# Patient Record
Sex: FEMALE | Race: BLACK OR AFRICAN AMERICAN | Employment: FULL TIME | URBAN - METROPOLITAN AREA
[De-identification: names, ages, dates, MRNs, and addresses within clinical notes are randomized per-mention and may not be internally consistent; named-entity substitution may affect disease eponyms.]

---

## 2017-12-06 ENCOUNTER — OFFICE VISIT (OUTPATIENT)
Dept: FAMILY MEDICINE | Facility: CLINIC | Age: 57
End: 2017-12-06
Payer: COMMERCIAL

## 2017-12-06 VITALS
DIASTOLIC BLOOD PRESSURE: 84 MMHG | SYSTOLIC BLOOD PRESSURE: 123 MMHG | BODY MASS INDEX: 30.49 KG/M2 | HEART RATE: 81 BPM | TEMPERATURE: 98 F | WEIGHT: 183 LBS | OXYGEN SATURATION: 99 % | HEIGHT: 65 IN

## 2017-12-06 DIAGNOSIS — E03.8 SUBCLINICAL HYPOTHYROIDISM: ICD-10-CM

## 2017-12-06 DIAGNOSIS — R42 DIZZY SPELLS: ICD-10-CM

## 2017-12-06 DIAGNOSIS — N95.1 PERIMENOPAUSAL: ICD-10-CM

## 2017-12-06 DIAGNOSIS — N92.6 IRREGULAR MENSES: Primary | ICD-10-CM

## 2017-12-06 DIAGNOSIS — Z86.2 HISTORY OF ANEMIA: ICD-10-CM

## 2017-12-06 LAB
ERYTHROCYTE [DISTWIDTH] IN BLOOD BY AUTOMATED COUNT: 12.9 % (ref 10–15)
HCT VFR BLD AUTO: 39.2 % (ref 35–47)
HGB BLD-MCNC: 13 G/DL (ref 11.7–15.7)
MCH RBC QN AUTO: 32.3 PG (ref 26.5–33)
MCHC RBC AUTO-ENTMCNC: 33.2 G/DL (ref 31.5–36.5)
MCV RBC AUTO: 97 FL (ref 78–100)
PLATELET # BLD AUTO: 313 10E9/L (ref 150–450)
RBC # BLD AUTO: 4.03 10E12/L (ref 3.8–5.2)
T4 FREE SERPL-MCNC: 0.92 NG/DL (ref 0.76–1.46)
TSH SERPL DL<=0.005 MIU/L-ACNC: 8.89 MU/L (ref 0.4–4)
WBC # BLD AUTO: 4.7 10E9/L (ref 4–11)

## 2017-12-06 PROCEDURE — 85027 COMPLETE CBC AUTOMATED: CPT | Performed by: FAMILY MEDICINE

## 2017-12-06 PROCEDURE — 84443 ASSAY THYROID STIM HORMONE: CPT | Performed by: FAMILY MEDICINE

## 2017-12-06 PROCEDURE — 84439 ASSAY OF FREE THYROXINE: CPT | Performed by: FAMILY MEDICINE

## 2017-12-06 PROCEDURE — 36415 COLL VENOUS BLD VENIPUNCTURE: CPT | Performed by: FAMILY MEDICINE

## 2017-12-06 PROCEDURE — 99214 OFFICE O/P EST MOD 30 MIN: CPT | Performed by: FAMILY MEDICINE

## 2017-12-06 NOTE — MR AVS SNAPSHOT
"              After Visit Summary   12/6/2017    Gilberto Bauman    MRN: [id number]           Patient Information     Date Of Birth          1960        Visit Information        Provider Department      12/6/2017 10:00 AM [facility]        Today's Diagnoses     Irregular menses    -  1    History of anemia        Perimenopausal        Subclinical hypothyroidism        Dizziness          Care Instructions    Schedule a Pelvic US          Follow-ups after your visit        Follow-up notes from your care team     Return if symptoms worsen or fail to improve.      Future tests that were ordered for you today     Open Future Orders        Priority Expected Expires Ordered    US Pelvic Complete w Transvaginal Routine  12/6/2018 12/6/2017            Who to contact     Normal or non-critical lab and imaging results will be communicated to you by [employer], letter or phone within 4 business days after the clinic has received the results. If you do not hear from us within 7 days, please contact the clinic through [employer] or phone. If you have a critical or abnormal lab result, we will notify you by phone as soon as possible.  Submit refill requests through [employer] or call your pharmacy and they will forward the refill request to us. Please allow 3 business days for your refill to be completed.          If you need to speak with a  for additional information , please call: [phone]             Additional Information About Your Visit        [employer] Information     [employer] lets you send messages to your doctor, view your test results, renew your prescriptions, schedule appointments and more. To sign up, go to [email or web address] . Click on \"Log in\" on the left side of the screen, which will take you to the Welcome page. Then click on \"Sign up Now\" on the right side of the page.     You will be asked to enter the access code listed below, as well as some personal " "information. Please follow the directions to create your username and password.     Your access code is: [email or web address]  Expires: 3/6/2018 11:21 AM     Your access code will  in 90 days. If you need help or a new code, please call your [place] clinic or [phone].        Care EveryWhere ID     This is your Care EveryWhere ID. This could be used by other organizations to access your [place] medical records  [id number]        Your Vitals Were     Pulse Temperature Height Pulse Oximetry Breastfeeding? BMI (Body Mass Index)    81 98  F (36.7  C) (Tympanic) 5' 5\" (1.651 m) 99% No 30.45 kg/m2       Blood Pressure from Last 3 Encounters:   17 123/84   01/15/16 116/74   16 112/72    Weight from Last 3 Encounters:   17 183 lb (83 kg)   01/15/16 194 lb 6.4 oz (88.2 kg)   16 194 lb (88 kg)              We Performed the Following     CBC with platelets     Folate     TSH with free T4 reflex     Vitamin B12     Vitamin D Deficiency        Primary Care Provider Office Phone # Fax #    [facility] [phone]       [address] [facility] [address]        Equal Access to Services     ABILIO CARMICHAEL : Hadii aad ku hadasho Soomaali, waaxda luqadaha, qaybta kaalmada adeegyada, waxay idiin hayaan nav khteofilo paula . So [facility] [phone].    ATENCIÓN: Si habla español, tiene a arechiga disposición servicios gratuitos de asistencia lingüística. Llame al [phone].    We comply with applicable federal civil rights laws and [place] laws. We do not discriminate on the basis of race, color, national origin, age, disability, sex, sexual orientation, or gender identity.            Thank you!     Thank you for choosing [facility]  for your care. Our goal is always to provide you with excellent care. Hearing back from our patients is one way we can continue to improve our services. Please take a few minutes to complete the written survey that you may receive in the " mail after your visit with us. Thank you!             Your Updated Medication List - Protect others around you: Learn how to safely use, store and throw away your medicines at www.disposemymeds.org.          This list is accurate as of: 12/6/17 11:21 AM.  Always use your most recent med list.                   Brand Name Dispense Instructions for use Diagnosis    NO ACTIVE MEDICATIONS

## 2017-12-06 NOTE — PATIENT INSTRUCTIONS
Schedule a Pelvic US  Perimenopause  Menopause is when you stop having periods for good. For many women, this happens around age 50. The time of change before this is called perimenopause. It may start in your late 30s or 40s. It can last for months or years. During this time, your body makes lower levels of female hormones. This causes certain changes in your body. You may already have begun to feel the effects of these changes. By taking steps to relieve symptoms, you can still feel good.    The menstrual cycle  Hormones are chemicals that have specific jobs in the body. A menstrual cycle is caused by changes in the levels of 2 female hormones. These hormones are estrogen and progesterone. They are made by the ovaries. In a normal cycle, estrogen creates a lining in the uterus to allow for pregnancy. The ovary then releases an egg. This is called ovulation. Progesterone levels start to go up. If the egg is not fertilized, progesterone levels go down. This causes the lining in the uterus to be shed. This is the bleeding that is your period.  Changes in hormones  As a woman ages, her ovaries begin to make hormones less regularly. In some months, there may not be ovulation. Without ovulation, progesterone isn't secreted so a normal period doesn't occur. The menstrual cycle will be harder to predict. Over time, the ovaries stop working. This can cause symptoms. Some women who have had their uterus taken out (hysterectomy) but still have ovaries may still have symptoms. When estrogen levels reach their lowest point, periods will stop completely. This is menopause.  Symptoms of perimenopause  The change in hormones can cause physical symptoms. It can also cause emotional symptoms. These may include:    Periods that come more or less often    Periods that are lighter or heavier than you re used to    Hot flashes, night sweats, or trouble sleeping    Vaginal dryness, which may make sex painful    Mood swings or  fatigue    Palpitations    Sleep disturbances    Urinary symptoms including frequency and incontinence.  Medications that may help  Some medications can help ease the effects of perimenopause. These include:    Low-dose birth control pills. These often contain both estrogen and progesterone. They can help regulate your periods.    Hormone therapy (HT). This replaces some of the hormones your body has stopped making.    Antidepressants. These help balance brain chemicals that may decrease during this time. Signs of depression can include often feeling sad or hopeless. If you feel this way, be sure to talk to your health care provider.    Gabapentin. This is a medication also used to treat seizures. This controls hot flashes and night sweats in some women.    Clonidine. This medication may control hot flashes and night sweats.  Date Last Reviewed: 4/26/2015 2000-2017 [employer]. [address] [place] [address], [place] [address]. All rights reserved. This information is not intended as a substitute for professional medical care. Always follow your healthcare professional's instructions.

## 2017-12-06 NOTE — PROGRESS NOTES
SUBJECTIVE:   Gilberto Bauman is a 57 year old female who presents to clinic today for the following health issues:      Vaginal Bleeding (Dysmenorrhea)  Onset: x5 days    Description:   Duration of bleeding episodes: NA  Frequency between periods:  Last period was under a year ago.   Describe bleeding/flow:   Clots: no  Number of pads/hour: 0-1; bleeding has not been heavy but is not becoming lighter with time either.   Cramping: none.  But reporting breast pains and back aches before bleeding started.     Accompanying Signs & Symptoms:  Weakness: YES- over the past x3 days   Lightheadedness: no  Hot flashes: no  Nosebleeds/Easy bruising: no  Vaginal Discharge: no    History:  No LMP recorded. Patient is not currently having periods (Reason: Irregular Periods).  Previous normal periods: no- use to get her periods Q3 months  Contraceptive use: NO  Possibility of Pregnancy: does not think so.   Any bleeding after intercourse: no  Age of first period (menarche): 13-15 YO  Abnormal PAP Smears: no    Precipitating factors:   none    Alleviating factors:  None      Therapies Tried and outcome: none      Patient has had similar concerns in the past. Saw her previous provider, Dr Thorpe in 2016. Labs done were remarkable for elevated TSH but normal free T4.   Pelvic US was requested but patient never followed through.     She has expressed concern in the past about her actual age. States that she is a refuge from [place] and thinks that she is younger than the current listed age but is not absolutely sure about her .    Problem list and histories reviewed & adjusted, as indicated.  Additional history: as documented    Patient Active Problem List   Diagnosis     CARDIOVASCULAR SCREENING; LDL GOAL LESS THAN 160     Vitamin D deficiency     Hypothyroidism, unspecified hypothyroidism type     Anemia, unspecified anemia type     Past Surgical History:   Procedure Laterality Date     NO HISTORY OF SURGERY         Social  "History   Substance Use Topics     Smoking status: Never Smoker     Smokeless tobacco: Never Used     Alcohol use No     Family History   Problem Relation Age of Onset     DIABETES No family hx of      Coronary Artery Disease No family hx of      Hypertension No family hx of      Hyperlipidemia No family hx of      CEREBROVASCULAR DISEASE No family hx of      Breast Cancer No family hx of      Colon Cancer No family hx of      Thyroid Disease No family hx of          Current Outpatient Prescriptions   Medication Sig Dispense Refill     NO ACTIVE MEDICATIONS        Allergies   Allergen Reactions     Amoxicillin      Rash         Reviewed and updated as needed this visit by clinical staffTobacco  Allergies  Meds       Reviewed and updated as needed this visit by Provider         ROS:  Constitutional, HEENT, cardiovascular, pulmonary, gi and gu systems are negative, except as otherwise noted.      OBJECTIVE:   /84  Pulse 81  Temp 98  F (36.7  C) (Tympanic)  Ht 5' 5\" (1.651 m)  Wt 183 lb (83 kg)  SpO2 99%  Breastfeeding? No  BMI 30.45 kg/m2  Body mass index is 30.45 kg/(m^2).  GENERAL: healthy, alert and no distress  RESP: lungs clear to auscultation - no rales, rhonchi or wheezes  CV: regular rate and rhythm, normal S1 S2, no S3 or S4, no murmur, click or rub, no peripheral edema and peripheral pulses strong  ABDOMEN: soft, nontender, no hepatosplenomegaly, no masses and bowel sounds normal    Diagnostic Test Results:  Reviewed and discussed with patient prior to discharge.  Results for orders placed or performed in visit on 12/06/17   CBC with platelets   Result Value Ref Range    WBC 4.7 4.0 - 11.0 10e9/L    RBC Count 4.03 3.8 - 5.2 10e12/L    Hemoglobin 13.0 11.7 - 15.7 g/dL    Hematocrit 39.2 35.0 - 47.0 %    MCV 97 78 - 100 fl    MCH 32.3 26.5 - 33.0 pg    MCHC 33.2 31.5 - 36.5 g/dL    RDW 12.9 10.0 - 15.0 %    Platelet Count 313 150 - 450 10e9/L         ASSESSMENT/PLAN:     Gilberto was seen today for " vaginal bleeding.    Diagnoses and all orders for this visit:    Irregular menses, likely due to Perimenopause; hypothyroidism  -     CBC with platelets  -     TSH with free T4 reflex  -     US Pelvic Complete w Transvaginal; Future    Perimenopausal  Patient education and Handout given      Subclinical hypothyroidism  -     TSH with free T4 reflex    History of anemia  -     CBC with platelets    Dizzy spells  -     CBC with platelets  -     TSH with free T4 reflex  -     Vitamin D Deficiency; Future  -     Vitamin B12; Future  -     Folate; Future    Will notify her with the rest of the labs,  US results and next steps.        Follow up if symptoms fail to improve or worsen.      The patient was in agreement with the plan today and had no questions or concerns prior to leaving the clinic.        Gracy Rocha MD  [facility]

## 2017-12-09 ENCOUNTER — RADIANT APPOINTMENT (OUTPATIENT)
Dept: ULTRASOUND IMAGING | Facility: CLINIC | Age: 57
End: 2017-12-09
Attending: FAMILY MEDICINE
Payer: COMMERCIAL

## 2017-12-09 DIAGNOSIS — N92.6 IRREGULAR MENSES: ICD-10-CM

## 2017-12-09 PROCEDURE — 76856 US EXAM PELVIC COMPLETE: CPT

## 2017-12-09 PROCEDURE — 76830 TRANSVAGINAL US NON-OB: CPT

## 2017-12-11 ENCOUNTER — RADIANT APPOINTMENT (OUTPATIENT)
Dept: GENERAL RADIOLOGY | Facility: CLINIC | Age: 57
End: 2017-12-11
Attending: PHYSICIAN ASSISTANT
Payer: COMMERCIAL

## 2017-12-11 ENCOUNTER — OFFICE VISIT (OUTPATIENT)
Dept: FAMILY MEDICINE | Facility: CLINIC | Age: 57
End: 2017-12-11
Payer: COMMERCIAL

## 2017-12-11 ENCOUNTER — TELEPHONE (OUTPATIENT)
Dept: FAMILY MEDICINE | Facility: CLINIC | Age: 57
End: 2017-12-11

## 2017-12-11 VITALS
SYSTOLIC BLOOD PRESSURE: 116 MMHG | WEIGHT: 182 LBS | BODY MASS INDEX: 30.32 KG/M2 | RESPIRATION RATE: 18 BRPM | HEIGHT: 65 IN | DIASTOLIC BLOOD PRESSURE: 82 MMHG | HEART RATE: 114 BPM | TEMPERATURE: 98.2 F | OXYGEN SATURATION: 98 %

## 2017-12-11 DIAGNOSIS — Z11.59 ENCOUNTER FOR HEPATITIS C SCREENING TEST FOR LOW RISK PATIENT: ICD-10-CM

## 2017-12-11 DIAGNOSIS — E06.3 HYPOTHYROIDISM DUE TO HASHIMOTO'S THYROIDITIS: ICD-10-CM

## 2017-12-11 DIAGNOSIS — Z12.11 SPECIAL SCREENING FOR MALIGNANT NEOPLASMS, COLON: ICD-10-CM

## 2017-12-11 DIAGNOSIS — F51.04 PSYCHOPHYSIOLOGICAL INSOMNIA: ICD-10-CM

## 2017-12-11 DIAGNOSIS — R93.89 THICKENED ENDOMETRIUM: ICD-10-CM

## 2017-12-11 DIAGNOSIS — Z12.31 ENCOUNTER FOR SCREENING MAMMOGRAM FOR BREAST CANCER: ICD-10-CM

## 2017-12-11 DIAGNOSIS — R79.89 ELEVATED TSH: Primary | ICD-10-CM

## 2017-12-11 DIAGNOSIS — R09.89 RESPIRATORY CRACKLES AT RIGHT LUNG BASE: ICD-10-CM

## 2017-12-11 LAB
T3 SERPL-MCNC: 96 NG/DL (ref 60–181)
T4 FREE SERPL-MCNC: 0.87 NG/DL (ref 0.76–1.46)
TSH SERPL DL<=0.005 MIU/L-ACNC: 10.27 MU/L (ref 0.4–4)

## 2017-12-11 PROCEDURE — 86376 MICROSOMAL ANTIBODY EACH: CPT | Performed by: PHYSICIAN ASSISTANT

## 2017-12-11 PROCEDURE — 36415 COLL VENOUS BLD VENIPUNCTURE: CPT | Performed by: PHYSICIAN ASSISTANT

## 2017-12-11 PROCEDURE — 86803 HEPATITIS C AB TEST: CPT | Performed by: PHYSICIAN ASSISTANT

## 2017-12-11 PROCEDURE — 99214 OFFICE O/P EST MOD 30 MIN: CPT | Performed by: PHYSICIAN ASSISTANT

## 2017-12-11 PROCEDURE — 84480 ASSAY TRIIODOTHYRONINE (T3): CPT | Performed by: PHYSICIAN ASSISTANT

## 2017-12-11 PROCEDURE — 84439 ASSAY OF FREE THYROXINE: CPT | Performed by: PHYSICIAN ASSISTANT

## 2017-12-11 PROCEDURE — 71020 XR CHEST 2 VW: CPT

## 2017-12-11 PROCEDURE — 84443 ASSAY THYROID STIM HORMONE: CPT | Performed by: PHYSICIAN ASSISTANT

## 2017-12-11 RX ORDER — MIRTAZAPINE 30 MG/1
30 TABLET, FILM COATED ORAL AT BEDTIME
Qty: 30 TABLET | Refills: 1 | Status: SHIPPED | OUTPATIENT
Start: 2017-12-11 | End: 2018-01-02

## 2017-12-11 NOTE — TELEPHONE ENCOUNTER
Spoke with patient and she gave permission for me to speak with sister Lidia.  Per sister patient is not doing any better.  Patient not eating or sleeping.  Sister wants patient to be seen today to be reevaluated and to go over results from 12-6-17.  Dr Rocha is out of office.  Patient scheduled with Scott Echeverria PA-C to be re-evaluated.

## 2017-12-11 NOTE — PROGRESS NOTES
SUBJECTIVE:   Gilberto Bauman is a 57 year old female who presents to clinic today for the following health issues:      Thyroid Problem-discuss results today  No snoring  Noting insomnia.  Variable appetite.   Some abdominal pain. Some mild nausea. No diarrhea.   No chest pain/sob/palps  No fevers.     Problem list and histories reviewed & adjusted, as indicated.  Additional history: as documented        Reviewed and updated as needed this visit by clinical staff  Tobacco  Allergies  Meds       Reviewed and updated as needed this visit by Provider         All other systems negative except as outline above    OBJECTIVE:  Eye exam - right eye normal lid, conjunctiva, cornea, pupil and fundus, left eye normal lid, conjunctiva, cornea, pupil and fundus.  ENT exam reveals - ENT exam normal, no neck nodes or sinus tenderness.  CHEST:chest clear to IPPA, no tachypnea, retractions or cyanosis and S1, S2 normal, no murmur, no gallop, rate regular. Originally course crackles appreciated in her RLL, these resolved have several deep breaths. Thyroid not palpable, not enlarged, no nodules detected.  No edema  The abdomen is soft without tenderness, guarding, mass, rebound or organomegaly. Bowel sounds are normal. No CVA tenderness or inguinal adenopathy noted.      Gilberto was seen today for thyroid problem.    Diagnoses and all orders for this visit:    Encounter for screening mammogram for breast cancer  -     *MA Screening Digital Bilateral; Future    Encounter for hepatitis C screening test for low risk patient  -     Hepatitis C Screen Reflex to HCV RNA Quant and Genotype    Special screening for malignant neoplasms, colon  -     Fecal colorectal cancer screen (FIT); Future    Thickened endometrium  -     OB/GYN REFERRAL    Elevated TSH  -     TSH with free T4 reflex  -     T3, total  -     Thyroid peroxidase antibody    Psychophysiological insomnia  -     mirtazapine (REMERON) 30 MG tablet; Take 1 tablet (30 mg) by mouth At  Bedtime    Respiratory crackles at right lung base  -     XR Chest 2 Views      work on lifestyle modification  Advised supportive and symptomatic treatment.  Follow up with Provider - if condition persists or worsens.

## 2017-12-11 NOTE — MR AVS SNAPSHOT
After Visit Summary   12/11/2017    Gilberto Bauman    MRN: [id number]           Patient Information     Date Of Birth          1960        Visit Information        Provider Department      12/11/2017 11:20 AM Scott Echeverria PA-C [facility]        Today's Diagnoses     Encounter for screening mammogram for breast cancer    -  1    Encounter for hepatitis C screening test for low risk patient        Special screening for malignant neoplasms, colon        Thickened endometrium        Elevated TSH        Psychophysiological insomnia        Respiratory crackles at right lung base           Follow-ups after your visit        Additional Services     OB/GYN REFERRAL       Your provider has referred you to:  [facility]: [facility] [phone]   [email or web address]    Please be aware that coverage of these services is subject to the terms and limitations of your health insurance plan.  Call member services at your health plan with any benefit or coverage questions.      Please bring the following with you to your appointment:    (1) Any X-Rays, CTs or MRIs which have been performed.  Contact the facility where they were done to arrange for  prior to your scheduled appointment.   (2) List of current medications   (3) This referral request   (4) Any documents/labs given to you for this referral                  Future tests that were ordered for you today     Open Future Orders        Priority Expected Expires Ordered    *MA Screening Digital Bilateral Routine  12/11/2018 12/11/2017    Fecal colorectal cancer screen (FIT) Routine 1/1/2018 3/5/2018 12/11/2017            Who to contact     Normal or non-critical lab and imaging results will be communicated to you by MyChart, letter or phone within 4 business days after the clinic has received the results. If you do not hear from us within 7 days, please contact the clinic through MyChart or phone. If  "you have a critical or abnormal lab result, we will notify you by phone as soon as possible.  Submit refill requests through [employer] or call your pharmacy and they will forward the refill request to us. Please allow 3 business days for your refill to be completed.          If you need to speak with a  for additional information , please call: [phone]             Additional Information About Your Visit        [employer] Information     [employer] lets you send messages to your doctor, view your test results, renew your prescriptions, schedule appointments and more. To sign up, go to [email or web address] . Click on \"Log in\" on the left side of the screen, which will take you to the Welcome page. Then click on \"Sign up Now\" on the right side of the page.     You will be asked to enter the access code listed below, as well as some personal information. Please follow the directions to create your username and password.     Your access code is: [email or web address]  Expires: 3/6/2018 11:21 AM     Your access code will  in 90 days. If you need help or a new code, please call your [place] clinic or [phone].        Care EveryWhere ID     This is your Care EveryWhere ID. This could be used by other organizations to access your [place] medical records  [id number]        Your Vitals Were     Pulse Temperature Respirations Height Pulse Oximetry BMI (Body Mass Index)    114 98.2  F (36.8  C) (Tympanic) 18 5' 5\" (1.651 m) 98% 30.29 kg/m2       Blood Pressure from Last 3 Encounters:   17 116/82   17 123/84   01/15/16 116/74    Weight from Last 3 Encounters:   17 182 lb (82.6 kg)   17 183 lb (83 kg)   01/15/16 194 lb 6.4 oz (88.2 kg)              We Performed the Following     Hepatitis C Screen Reflex to HCV RNA Quant and Genotype     OB/GYN REFERRAL     T3, total     Thyroid peroxidase antibody     TSH with free T4 reflex     XR Chest 2 Views          Today's Medication Changes    "       These changes are accurate as of: 12/11/17 12:17 PM.  If you have any questions, ask your nurse or doctor.               Start taking these medicines.        Dose/Directions    mirtazapine 30 MG tablet   Commonly known as:  REMERON   Used for:  Psychophysiological insomnia   Started by:  Scott Echeverria PA-C        Dose:  30 mg   Take 1 tablet (30 mg) by mouth At Bedtime   Quantity:  30 tablet   Refills:  1            Where to get your medicines      [facility] [address] AT [facility] [address] [place]  [address] [facility] [place] [address]     Phone:  [phone]     mirtazapine 30 MG tablet                Primary Care Provider Office Phone # Fax #    [facility] [phone] [address] [facility] [address]        Equal Access to Services     [facility] CenterBRITTANY : Hadii aad ku hadasho Soomaali, waaxda luqadaha, qaybta kaalmada adeegyada, waxay idiin hayaan nav paula . So [facility] [phone].    ATENCIÓN: Si habla español, tiene a arechiga disposición servicios gratuitos de asistencia lingüística. Familia al [phone].    We comply with applicable federal civil rights laws and [place] laws. We do not discriminate on the basis of race, color, national origin, age, disability, sex, sexual orientation, or gender identity.            Thank you!     Thank you for choosing [facility]  for your care. Our goal is always to provide you with excellent care. Hearing back from our patients is one way we can continue to improve our services. Please take a few minutes to complete the written survey that you may receive in the mail after your visit with us. Thank you!             Your Updated Medication List - Protect others around you: Learn how to safely use, store and throw away your medicines at www.disposemymeds.org.          This list is accurate as of: 12/11/17 12:17 PM.  Always  use your most recent med list.                   Brand Name Dispense Instructions for use Diagnosis    mirtazapine 30 MG tablet    REMERON    30 tablet    Take 1 tablet (30 mg) by mouth At Bedtime    Psychophysiological insomnia       NO ACTIVE MEDICATIONS

## 2017-12-11 NOTE — TELEPHONE ENCOUNTER
She has been sick for almost 2 weeks. She hasnt been eating or sleeping.  She is weak and her feet are cold.  She did labs and ultrasound Friday.  Please call to advise.

## 2017-12-12 LAB
HCV AB SERPL QL IA: NONREACTIVE
THYROPEROXIDASE AB SERPL-ACNC: 161 IU/ML

## 2017-12-25 PROBLEM — E06.3 HYPOTHYROIDISM DUE TO HASHIMOTO'S THYROIDITIS: Status: ACTIVE | Noted: 2017-12-25

## 2017-12-25 RX ORDER — LEVOTHYROXINE SODIUM 25 UG/1
25 TABLET ORAL DAILY
Qty: 80 TABLET | Refills: 0 | Status: SHIPPED | OUTPATIENT
Start: 2017-12-25 | End: 2018-01-18

## 2017-12-27 ENCOUNTER — TRANSFERRED RECORDS (OUTPATIENT)
Dept: HEALTH INFORMATION MANAGEMENT | Facility: CLINIC | Age: 57
End: 2017-12-27

## 2017-12-29 ENCOUNTER — TELEPHONE (OUTPATIENT)
Dept: FAMILY MEDICINE | Facility: CLINIC | Age: 57
End: 2017-12-29

## 2017-12-29 NOTE — TELEPHONE ENCOUNTER
Spoke with pt and informed her that Vit D was not drawn, it is a future lab. She would have to make a lab only appt to get that done. SHe verbalized understanding.  Will send to Scott to review Xray results from 12/11- Impression states no acute pulmonary disease.

## 2018-01-02 ENCOUNTER — OFFICE VISIT (OUTPATIENT)
Dept: FAMILY MEDICINE | Facility: CLINIC | Age: 58
End: 2018-01-02
Payer: COMMERCIAL

## 2018-01-02 VITALS
HEIGHT: 64 IN | BODY MASS INDEX: 30.59 KG/M2 | OXYGEN SATURATION: 98 % | TEMPERATURE: 97.5 F | DIASTOLIC BLOOD PRESSURE: 60 MMHG | SYSTOLIC BLOOD PRESSURE: 112 MMHG | HEART RATE: 87 BPM | WEIGHT: 179.2 LBS | RESPIRATION RATE: 14 BRPM

## 2018-01-02 DIAGNOSIS — E06.3 HASHIMOTO'S THYROIDITIS: Primary | ICD-10-CM

## 2018-01-02 PROCEDURE — 99213 OFFICE O/P EST LOW 20 MIN: CPT | Performed by: FAMILY MEDICINE

## 2018-01-02 RX ORDER — LANOLIN ALCOHOL/MO/W.PET/CERES
6 CREAM (GRAM) TOPICAL AT BEDTIME
COMMUNITY
Start: 2017-12-27 | End: 2018-01-04

## 2018-01-02 NOTE — NURSING NOTE
"Chief Complaint   Patient presents with     ER F/U     was seen at [facility] for Insomnia        Initial /60  Pulse 87  Temp 97.5  F (36.4  C) (Oral)  Resp 14  Ht 5' 4.41\" (1.636 m)  Wt 179 lb 3.2 oz (81.3 kg)  SpO2 98%  BMI 30.37 kg/m2 Estimated body mass index is 30.37 kg/(m^2) as calculated from the following:    Height as of this encounter: 5' 4.41\" (1.636 m).    Weight as of this encounter: 179 lb 3.2 oz (81.3 kg).  Medication Reconciliation: complete     An RAGHAV Romero    "

## 2018-01-02 NOTE — MR AVS SNAPSHOT
After Visit Summary   1/2/2018    Gilberto Bauman    MRN: [id number]           Patient Information     Date Of Birth          1960        Visit Information        Provider Department      1/2/2018 2:45 PM [facility]        Today's Diagnoses     Hashimoto's thyroiditis    -  1      Care Instructions    [facility]    If you have any questions regarding to your visit please contact your care team:       Team Purple:   Clinic Hours Telephone Number   Dr. Enma Still   7am-7pm  Monday - Thursday   7am-5pm  Fridays  [phone]  (Appointment scheduling available 24/7)    Questions about your Visit?   Team Line:  [phone]   Urgent Care - [place] and [facility] - 11am-9pm Monday-Friday Saturday-Sunday- 9am-5pm   [place] - 5pm-9pm Monday-Friday Saturday-Sunday- 9am-5pm  [phone] - [facility]  [phone] - [place]       What options do I have for visits at the clinic other than the traditional office visit?  To expand how we care for you, many of our providers are utilizing electronic visits (e-visits) and telephone visits, when medically appropriate, for interactions with their patients rather than a visit in the clinic.   We also offer nurse visits for many medical concerns. Just like any other service, we will bill your insurance company for this type of visit based on time spent on the phone with your provider. Not all insurance companies cover these visits. Please check with your medical insurance if this type of visit is covered. You will be responsible for any charges that are not paid by your insurance.      E-visits via [employer]:  generally incur a $35.00 fee.  Telephone visits:  Time spent on the phone: *charged based on time that is spent on the phone in increments of 10 minutes. Estimated cost:   5-10 mins $30.00   11-20 mins. $59.00   21-30 mins. $85.00      Use UpEnergy (secure email communication and access to your chart) to send your primary care provider a message or make an appointment. Ask someone on your Team how to sign up for UpEnergy.  For a Price Quote for your services, please call our Consumer Price Line at [phone].  As always, Thank you for trusting us with your health care needs!              Follow-ups after your visit        Additional Services     ENDOCRINOLOGY ADULT REFERRAL       Your provider has referred you to: [facility]: Endocrinology and Diabetes Clinic [facility] [phone]   [email or web address]  [place]: [facility] [phone]   [email or web address]/  N: Endocrinology Clinic [facility] [phone]   [email or web address]      Please be aware that coverage of these services is subject to the terms and limitations of your health insurance plan.  Call member services at your health plan with any benefit or coverage questions.      Please bring the following to your appointment:    >>   Any x-rays, CTs or MRIs which have been performed.  Contact the facility where they were done to arrange for  prior to your scheduled appointment.  Any new CT, MRI or other procedures ordered by your specialist must be performed at a [place] facility or coordinated by your clinic's referral office.    >>   List of current medications   >>   This referral request   >>   Any documents/labs given to you for this referral                  Who to contact     If you have questions or need follow up information about today's clinic visit or your schedule please contact [facility] directly at [phone].  Normal or non-critical lab and imaging results will be communicated to you by MyChart, letter or phone within 4 business days after the clinic has received the results. If you do not hear  "from us within 7 days, please contact the clinic through HistoPathway or phone. If you have a critical or abnormal lab result, we will notify you by phone as soon as possible.  Submit refill requests through HistoPathway or call your pharmacy and they will forward the refill request to us. Please allow 3 business days for your refill to be completed.          Additional Information About Your Visit        AtoomaharSigasi Information     HistoPathway lets you send messages to your doctor, view your test results, renew your prescriptions, schedule appointments and more. To sign up, go to [email or web address] . Click on \"Log in\" on the left side of the screen, which will take you to the Welcome page. Then click on \"Sign up Now\" on the right side of the page.     You will be asked to enter the access code listed below, as well as some personal information. Please follow the directions to create your username and password.     Your access code is: [email or web address]  Expires: 3/6/2018 11:21 AM     Your access code will  in 90 days. If you need help or a new code, please call your [place] clinic or [phone].        Care EveryWhere ID     This is your Care EveryWhere ID. This could be used by other organizations to access your [place] medical records  [id number]        Your Vitals Were     Pulse Temperature Respirations Height Pulse Oximetry BMI (Body Mass Index)    87 97.5  F (36.4  C) (Oral) 14 5' 4.41\" (1.636 m) 98% 30.37 kg/m2       Blood Pressure from Last 3 Encounters:   18 112/60   17 116/82   17 123/84    Weight from Last 3 Encounters:   18 179 lb 3.2 oz (81.3 kg)   17 182 lb (82.6 kg)   17 183 lb (83 kg)              We Performed the Following     ENDOCRINOLOGY ADULT REFERRAL        Primary Care Provider Office Phone # Fax #    [facility] [phone]       [address] [place] [address]        Equal Access to Services     ABILIO CARMICHAEL AH: Juan Alberto porter " sarah Owens, mo patinodianeha, dionte katulio champion, uzma maryin hayaan joseerene jermaineteofilo lalorenzosharon ledy. So [facility] [phone].    ATENCIÓN: Si habla español, tiene a arechiga disposición servicios gratuitos de asistencia lingüística. Familia al [phone].    We comply with applicable federal civil rights laws and [place] laws. We do not discriminate on the basis of race, color, national origin, age, disability, sex, sexual orientation, or gender identity.            Thank you!     Thank you for choosing [facility]  for your care. Our goal is always to provide you with excellent care. Hearing back from our patients is one way we can continue to improve our services. Please take a few minutes to complete the written survey that you may receive in the mail after your visit with us. Thank you!             Your Updated Medication List - Protect others around you: Learn how to safely use, store and throw away your medicines at www.disposemymeds.org.          This list is accurate as of: 1/2/18  3:27 PM.  Always use your most recent med list.                   Brand Name Dispense Instructions for use Diagnosis    levothyroxine 25 MCG tablet    SYNTHROID/LEVOTHROID    80 tablet    Take 1 tablet (25 mcg) by mouth daily    Hypothyroidism due to Hashimoto's thyroiditis       melatonin 3 MG tablet      Take 6 mg by mouth At Bedtime

## 2018-01-02 NOTE — PROGRESS NOTES
SUBJECTIVE:   Gilberto Bauman is a 57 year old female who presents to clinic today for the following health issues:      ED/UC Followup:    Facility:  [facility]   Date of visit: 12/27/2017  Reason for visit: Insomnia   Current Status: symptoms is not getting better              Problem list and histories reviewed & adjusted, as indicated.  Additional history: as documented    Patient Active Problem List   Diagnosis     CARDIOVASCULAR SCREENING; LDL GOAL LESS THAN 160     Vitamin D deficiency     Hypothyroidism, unspecified hypothyroidism type     Anemia, unspecified anemia type     Hypothyroidism due to Hashimoto's thyroiditis     Past Surgical History:   Procedure Laterality Date     NO HISTORY OF SURGERY         Social History   Substance Use Topics     Smoking status: Never Smoker     Smokeless tobacco: Never Used     Alcohol use No     Family History   Problem Relation Age of Onset     DIABETES No family hx of      Coronary Artery Disease No family hx of      Hypertension No family hx of      Hyperlipidemia No family hx of      CEREBROVASCULAR DISEASE No family hx of      Breast Cancer No family hx of      Colon Cancer No family hx of      Thyroid Disease No family hx of          Current Outpatient Prescriptions   Medication Sig Dispense Refill     melatonin 3 MG tablet Take 6 mg by mouth At Bedtime       levothyroxine (SYNTHROID/LEVOTHROID) 25 MCG tablet Take 1 tablet (25 mcg) by mouth daily 80 tablet 0     Allergies   Allergen Reactions     Amoxicillin      Rash     BP Readings from Last 3 Encounters:   01/02/18 112/60   12/11/17 116/82   12/06/17 123/84    Wt Readings from Last 3 Encounters:   01/02/18 179 lb 3.2 oz (81.3 kg)   12/11/17 182 lb (82.6 kg)   12/06/17 183 lb (83 kg)                  Labs reviewed in EPIC        Reviewed and updated as needed this visit by clinical staffTobacco  Allergies  Meds  Med Hx  Surg Hx  Fam Hx  Soc Hx      Reviewed and updated as needed this visit by Provider      "    ROS:  This 57 year old female is here today because she has developed sudden difficulty sleeping. She can't sleep about every other night. She is exhausted come morning. She has tried melatonin, diphenhydramine, and remeron with no help. 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 She has never had this type of problem until about 1 month ago. She has an anxious feeling with some palpitations when she can't sleep. She was found to have an elevated TSH of 10.27 on 12/11/17. Her T3 was normal at 96 and her free T4 was normal at 0.87 but her thyroid antibodies were elevated to 161. She was started on low dose synthroid at 25 mcg daily. She is confused why she suddenly has insomnia, anxiety feelings, and episodes of palpitations. She really wants a pill to help her sleep. Her muscles feel tight all over her upper back for no known reason.  All other review of systems are negative  Personal, family, and social history reviewed with patient and revised.         OBJECTIVE:     /60  Pulse 87  Temp 97.5  F (36.4  C) (Oral)  Resp 14  Ht 5' 4.41\" (1.636 m)  Wt 179 lb 3.2 oz (81.3 kg)  SpO2 98%  BMI 30.37 kg/m2  Body mass index is 30.37 kg/(m^2).  Heart: normal rate and rhythm with no murmur  Lungs: clear in all fields  Thyroid: feels normal  No exophthalmus  Well hydrated  Well nourished  Well groomed  Alert and oriented X 3  Good spirits  Upper back muscles feel normal     Diagnostic Test Results:  none     ASSESSMENT/PLAN:              1. Hashimoto's thyroiditis  As above, discussed that her \"anxious feeling and palpitations\" are related to her thyroiditis and thus cause insomnia. She needs to see endocrinology or IM to evaluate this further. There are normal sleeping pills that will help her.   - ENDOCRINOLOGY ADULT REFERRAL    Return to clinic if no improvement     DEVORAH GARY MD  [facility]  "

## 2018-01-02 NOTE — PATIENT INSTRUCTIONS
[facility]    If you have any questions regarding to your visit please contact your care team:       Team Purple:   Clinic Hours Telephone Number   Dr. Enma Still   7am-7pm  Monday - Thursday   7am-5pm  Fridays  [phone]  (Appointment scheduling available 24/7)    Questions about your Visit?   Team Line:  [phone]   Urgent Care - [place] and [facility] - 11am-9pm Monday-Friday Saturday-Sunday- 9am-5pm   [place] - 5pm-9pm Monday-Friday Saturday-Sunday- 9am-5pm  [phone] - [facility]  [phone] - [place]       What options do I have for visits at the clinic other than the traditional office visit?  To expand how we care for you, many of our providers are utilizing electronic visits (e-visits) and telephone visits, when medically appropriate, for interactions with their patients rather than a visit in the clinic.   We also offer nurse visits for many medical concerns. Just like any other service, we will bill your insurance company for this type of visit based on time spent on the phone with your provider. Not all insurance companies cover these visits. Please check with your medical insurance if this type of visit is covered. You will be responsible for any charges that are not paid by your insurance.      E-visits via [employer]:  generally incur a $35.00 fee.  Telephone visits:  Time spent on the phone: *charged based on time that is spent on the phone in increments of 10 minutes. Estimated cost:   5-10 mins $30.00   11-20 mins. $59.00   21-30 mins. $85.00     Use Locuhart (secure email communication and access to your chart) to send your primary care provider a message or make an appointment. Ask someone on your Team how to sign up for [employer].  For a Price Quote for your services, please call our Consumer Price Line at [phone].  As always, Thank you for trusting us with your health care needs!

## 2018-01-02 NOTE — TELEPHONE ENCOUNTER
"Spoke with patient and gave below information, she voiced understanding, but demands to speak with Scott today.  She wants a work in appointment today, \"she is not sleeping at all at night\".  RN tried to get more information from patient, but she was insistent on seeing or speaking with Scott Echeverria only.  Natividad Judge RN    "

## 2018-01-04 ENCOUNTER — OFFICE VISIT (OUTPATIENT)
Dept: FAMILY MEDICINE | Facility: CLINIC | Age: 58
End: 2018-01-04
Payer: COMMERCIAL

## 2018-01-04 ENCOUNTER — TRANSFERRED RECORDS (OUTPATIENT)
Dept: HEALTH INFORMATION MANAGEMENT | Facility: CLINIC | Age: 58
End: 2018-01-04

## 2018-01-04 VITALS
OXYGEN SATURATION: 100 % | HEIGHT: 64 IN | BODY MASS INDEX: 30.77 KG/M2 | DIASTOLIC BLOOD PRESSURE: 60 MMHG | WEIGHT: 180.2 LBS | RESPIRATION RATE: 18 BRPM | TEMPERATURE: 97 F | HEART RATE: 90 BPM | SYSTOLIC BLOOD PRESSURE: 112 MMHG

## 2018-01-04 DIAGNOSIS — F51.01 PRIMARY INSOMNIA: ICD-10-CM

## 2018-01-04 DIAGNOSIS — F41.9 ANXIETY: Primary | ICD-10-CM

## 2018-01-04 PROCEDURE — 99213 OFFICE O/P EST LOW 20 MIN: CPT | Performed by: FAMILY MEDICINE

## 2018-01-04 NOTE — PATIENT INSTRUCTIONS
[facility]    If you have any questions regarding to your visit please contact your care team:       Team Purple:   Clinic Hours Telephone Number   Dr. Enma Still   7am-7pm  Monday - Thursday   7am-5pm  Fridays  [phone]  (Appointment scheduling available 24/7)    Questions about your Visit?   Team Line:  [phone]   Urgent Care - [place] and [facility] - 11am-9pm Monday-Friday Saturday-Sunday- 9am-5pm   [place] - 5pm-9pm Monday-Friday Saturday-Sunday- 9am-5pm  [phone] - [facility]  [phone] - [place]       What options do I have for visits at the clinic other than the traditional office visit?  To expand how we care for you, many of our providers are utilizing electronic visits (e-visits) and telephone visits, when medically appropriate, for interactions with their patients rather than a visit in the clinic.   We also offer nurse visits for many medical concerns. Just like any other service, we will bill your insurance company for this type of visit based on time spent on the phone with your provider. Not all insurance companies cover these visits. Please check with your medical insurance if this type of visit is covered. You will be responsible for any charges that are not paid by your insurance.      E-visits via Bomber[employer]:  generally incur a $35.00 fee.  Telephone visits:  Time spent on the phone: *charged based on time that is spent on the phone in increments of 10 minutes. Estimated cost:   5-10 mins $30.00   11-20 mins. $59.00   21-30 mins. $85.00     Use EmerGeo Solutionshart (secure email communication and access to your chart) to send your primary care provider a message or make an appointment. Ask someone on your Team how to sign up for Bomberbot.  For a Price Quote for your services, please call our Consumer Price Line at [phone].  As always, Thank you for trusting us with your health care needs!

## 2018-01-04 NOTE — MR AVS SNAPSHOT
After Visit Summary   1/4/2018    Gilberto Bauman    MRN: [id number]           Patient Information     Date Of Birth          1960        Visit Information        Provider Department      1/4/2018 2:30 PM Enma Hough MD [facility]        Today's Diagnoses     Anxiety    -  1    Primary insomnia          Care Instructions    [facility]    If you have any questions regarding to your visit please contact your care team:       Team Purple:   Clinic Hours Telephone Number   Dr. Enma Still   7am-7pm  Monday - Thursday   7am-5pm  Fridays  [phone]  (Appointment scheduling available 24/7)    Questions about your Visit?   Team Line:  [phone]   Urgent Care - [place] and [facility] - 11am-9pm Monday-Friday Saturday-Sunday- 9am-5pm   [place] - 5pm-9pm Monday-Friday Saturday-Sunday- 9am-5pm  [phone] - [facility]  [phone] [facility]       What options do I have for visits at the clinic other than the traditional office visit?  To expand how we care for you, many of our providers are utilizing electronic visits (e-visits) and telephone visits, when medically appropriate, for interactions with their patients rather than a visit in the clinic.   We also offer nurse visits for many medical concerns. Just like any other service, we will bill your insurance company for this type of visit based on time spent on the phone with your provider. Not all insurance companies cover these visits. Please check with your medical insurance if this type of visit is covered. You will be responsible for any charges that are not paid by your insurance.      E-visits via [employer]:  generally incur a $35.00 fee.  Telephone visits:  Time spent on the phone: *charged based on time that is spent on the phone in increments of 10 minutes. Estimated cost:   5-10 mins $30.00   11-20 mins. $59.00   21-30 mins.  "$85.00     Use EntrenaYahart (secure email communication and access to your chart) to send your primary care provider a message or make an appointment. Ask someone on your Team how to sign up for GoCoint.  For a Price Quote for your services, please call our Consumer Price Line at [phone].  As always, Thank you for trusting us with your health care needs!              Follow-ups after your visit        Who to contact     If you have questions or need follow up information about today's clinic visit or your schedule please contact [facility] directly at [phone].  Normal or non-critical lab and imaging results will be communicated to you by EntrenaYahart, letter or phone within 4 business days after the clinic has received the results. If you do not hear from us within 7 days, please contact the clinic through GoCoint or phone. If you have a critical or abnormal lab result, we will notify you by phone as soon as possible.  Submit refill requests through [email or web address] or call your pharmacy and they will forward the refill request to us. Please allow 3 business days for your refill to be completed.          Additional Information About Your Visit        EntrenaYahardaysoft Information     [email or web address] lets you send messages to your doctor, view your test results, renew your prescriptions, schedule appointments and more. To sign up, go to [email or web address] . Click on \"Log in\" on the left side of the screen, which will take you to the Welcome page. Then click on \"Sign up Now\" on the right side of the page.     You will be asked to enter the access code listed below, as well as some personal information. Please follow the directions to create your username and password.     Your access code is: [email or web address]  Expires: 3/6/2018 11:21 AM     Your access code will  in 90 days. If you need help or a new code, please call your [facility] or [phone].        Care EveryWhere ID     This is your Care EveryWhere ID. This " "could be used by other organizations to access your [place] medical records  [id number]        Your Vitals Were     Pulse Temperature Respirations Height Pulse Oximetry BMI (Body Mass Index)    90 97  F (36.1  C) (Oral) 18 5' 4.41\" (1.636 m) 100% 30.54 kg/m2       Blood Pressure from Last 3 Encounters:   01/04/18 112/60   01/02/18 112/60   12/11/17 116/82    Weight from Last 3 Encounters:   01/04/18 180 lb 3.2 oz (81.7 kg)   01/02/18 179 lb 3.2 oz (81.3 kg)   12/11/17 182 lb (82.6 kg)              Today, you had the following     No orders found for display         Today's Medication Changes          These changes are accurate as of: 1/4/18  3:02 PM.  If you have any questions, ask your nurse or doctor.               Start taking these medicines.        Dose/Directions    sertraline 50 MG tablet   Commonly known as:  ZOLOFT   Used for:  Anxiety, Primary insomnia   Started by:  Enma Hough MD        Dose:  50 mg   Take 1 tablet (50 mg) by mouth daily   Quantity:  90 tablet   Refills:  3            Where to get your medicines      These medications were sent to [facility] - [address]  [address], [place] [address]     Phone:  [phone]     sertraline 50 MG tablet                Primary Care Provider Office Phone # Fax #    [facility] [phone] [address]  [place] [address]        Equal Access to Services     [facility]: Hadii anita porter hadasho Soomaali, waaxda luqadaha, qaybta kaalmada adejeanne, uzma paula . So [facility] [phone].    ATENCIÓN: Si habla español, tiene a arechiga disposición servicios gratuitos de asistencia lingüística. Llame al [phone].    We comply with applicable federal civil rights laws and [place] laws. We do not discriminate on the basis of race, color, national origin, age, disability, sex, sexual orientation, or gender identity.          "   Thank you!     Thank you for choosing [facility]  for your care. Our goal is always to provide you with excellent care. Hearing back from our patients is one way we can continue to improve our services. Please take a few minutes to complete the written survey that you may receive in the mail after your visit with us. Thank you!             Your Updated Medication List - Protect others around you: Learn how to safely use, store and throw away your medicines at www.disposemymeds.org.          This list is accurate as of: 1/4/18  3:02 PM.  Always use your most recent med list.                   Brand Name Dispense Instructions for use Diagnosis    levothyroxine 25 MCG tablet    SYNTHROID/LEVOTHROID    80 tablet    Take 1 tablet (25 mcg) by mouth daily    Hypothyroidism due to Hashimoto's thyroiditis       sertraline 50 MG tablet    ZOLOFT    90 tablet    Take 1 tablet (50 mg) by mouth daily    Anxiety, Primary insomnia

## 2018-01-04 NOTE — PROGRESS NOTES
SUBJECTIVE:   Gilberto Bauman is a 57 year old female who presents to clinic today for the following health issues:      Patient presents with:  RECHECK: sleep Issues- symptoms not improving              Problem list and histories reviewed & adjusted, as indicated.  Additional history: as documented    Patient Active Problem List   Diagnosis     CARDIOVASCULAR SCREENING; LDL GOAL LESS THAN 160     Vitamin D deficiency     Hypothyroidism, unspecified hypothyroidism type     Anemia, unspecified anemia type     Hypothyroidism due to Hashimoto's thyroiditis     Anxiety     Past Surgical History:   Procedure Laterality Date     NO HISTORY OF SURGERY         Social History   Substance Use Topics     Smoking status: Never Smoker     Smokeless tobacco: Never Used     Alcohol use No     Family History   Problem Relation Age of Onset     DIABETES No family hx of      Coronary Artery Disease No family hx of      Hypertension No family hx of      Hyperlipidemia No family hx of      CEREBROVASCULAR DISEASE No family hx of      Breast Cancer No family hx of      Colon Cancer No family hx of      Thyroid Disease No family hx of          Allergies   Allergen Reactions     Amoxicillin      Rash     BP Readings from Last 3 Encounters:   01/04/18 112/60   01/02/18 112/60   12/11/17 116/82    Wt Readings from Last 3 Encounters:   01/04/18 180 lb 3.2 oz (81.7 kg)   01/02/18 179 lb 3.2 oz (81.3 kg)   12/11/17 182 lb (82.6 kg)                  Labs reviewed in EPIC        Reviewed and updated as needed this visit by clinical staffTobacco  Allergies  Meds  Med Hx  Surg Hx  Fam Hx  Soc Hx      Reviewed and updated as needed this visit by Provider         ROS:  This 57 year old female is here today because I saw her 1/2/17 for insomnia and thought it could be related to her thyroiditis and hypothyroidism. She was able to get into see an endocrinologist in [place] yesterday. He didn't feel that her insomnia was related to her hypothyroidism.  "She is back her today because she just can't sleep and that causes terrible anxiety, and then the anxiety causes insomnia, and she can't get out of that cycle. She is worried about one of her children. She has tried melatonin, benadryl, and remeron with no help. She is willing to try a medication to help with anxiety. All other review of systems are negative  Personal, family, and social history reviewed with patient and revised.         OBJECTIVE:     /60  Pulse 90  Temp 97  F (36.1  C) (Oral)  Resp 18  Ht 5' 4.41\" (1.636 m)  Wt 180 lb 3.2 oz (81.7 kg)  SpO2 100%  BMI 30.54 kg/m2  Body mass index is 30.54 kg/(m^2).  GENERAL: healthy, alert and no distress  NECK: no adenopathy, no asymmetry, masses, or scars and thyroid normal to palpation  RESP: lungs clear to auscultation - no rales, rhonchi or wheezes  CV: regular rate and rhythm, normal S1 S2, no S3 or S4, no murmur, click or rub, no peripheral edema and peripheral pulses strong  ABDOMEN: soft,   MS: no gross musculoskeletal defects noted, no edema  Her thought processes are normal.   Well hydrated  Well nourished  Well groomed    Diagnostic Test Results:  none     ASSESSMENT/PLAN:              1. Anxiety  As above   - sertraline (ZOLOFT) 50 MG tablet; Take 1 tablet (50 mg) by mouth daily  Dispense: 90 tablet; Refill: 3    2. Primary insomnia  As above   - sertraline (ZOLOFT) 50 MG tablet; Take 1 tablet (50 mg) by mouth daily  Dispense: 90 tablet; Refill: 3    Return to clinic 1 month to recheck how she is doing. Encouraged her to be on zoloft at least 3 months    DEVORAH GARY MD  [facility]  "

## 2018-01-04 NOTE — NURSING NOTE
"Chief Complaint   Patient presents with     RECHECK     sleep Issues- symptoms not improving        Initial /60  Pulse 90  Temp 97  F (36.1  C) (Oral)  Resp 18  Ht 5' 4.41\" (1.636 m)  Wt 180 lb 3.2 oz (81.7 kg)  SpO2 100%  BMI 30.54 kg/m2 Estimated body mass index is 30.54 kg/(m^2) as calculated from the following:    Height as of this encounter: 5' 4.41\" (1.636 m).    Weight as of this encounter: 180 lb 3.2 oz (81.7 kg).  Medication Reconciliation: complete     An RAGHAV Romero    "

## 2018-01-08 ENCOUNTER — OFFICE VISIT (OUTPATIENT)
Dept: FAMILY MEDICINE | Facility: CLINIC | Age: 58
End: 2018-01-08
Payer: COMMERCIAL

## 2018-01-08 VITALS
RESPIRATION RATE: 18 BRPM | SYSTOLIC BLOOD PRESSURE: 129 MMHG | DIASTOLIC BLOOD PRESSURE: 85 MMHG | BODY MASS INDEX: 29.66 KG/M2 | HEIGHT: 65 IN | HEART RATE: 107 BPM | WEIGHT: 178 LBS | TEMPERATURE: 98.2 F | OXYGEN SATURATION: 99 %

## 2018-01-08 DIAGNOSIS — E06.3 HYPOTHYROIDISM DUE TO HASHIMOTO'S THYROIDITIS: ICD-10-CM

## 2018-01-08 DIAGNOSIS — F51.01 PRIMARY INSOMNIA: ICD-10-CM

## 2018-01-08 DIAGNOSIS — F51.04 PSYCHOPHYSIOLOGICAL INSOMNIA: Primary | ICD-10-CM

## 2018-01-08 DIAGNOSIS — F41.9 ANXIETY: ICD-10-CM

## 2018-01-08 PROCEDURE — 99214 OFFICE O/P EST MOD 30 MIN: CPT | Performed by: PHYSICIAN ASSISTANT

## 2018-01-08 RX ORDER — TEMAZEPAM 15 MG/1
15-30 CAPSULE ORAL
Qty: 60 CAPSULE | Refills: 0 | Status: SHIPPED | OUTPATIENT
Start: 2018-01-08 | End: 2018-08-28

## 2018-01-08 NOTE — PROGRESS NOTES
SUBJECTIVE:   Gilberto Bauman is a 57 year old female who presents to clinic today for the following health issues:      Insomnia  Onset: chronic    Description:   Time to fall asleep (sleep latency): 2 hours  Middle of night awakening:  YES  Early morning awakening:  YES    Progression of Symptoms:  worsening    Accompanying Signs & Symptoms:  Daytime sleepiness/napping: YES  Excessive snoring/apnea: no   Restless legs: no  Frequent urination: no  Chronic pain:  no    History:  Prior Insomnia: YES    Precipitating factors:   New stressful situation: no   Caffeine intake: no  OTC decongestants: no  Any new medications: no    Alleviating factors:  Self medicating (alcohol, etc.):  no    Therapies Tried and outcome: none      Recheck of her hypothyroidism. Labs reviewed.  Now taking 50 mcg of levothyroxine.         Problem list and histories reviewed & adjusted, as indicated.  Additional history: as documented        Reviewed and updated as needed this visit by clinical staff  Tobacco  Allergies  Meds       Reviewed and updated as needed this visit by Provider         All other systems negative except as outline above  OBJECTIVE:  Eye exam - right eye normal lid, conjunctiva, cornea, pupil and fundus, left eye normal lid, conjunctiva, cornea, pupil and fundus.  Thyroid not palpable, not enlarged, no nodules detected.  CHEST:chest clear to IPPA, no tachypnea, retractions or cyanosis and S1, S2 normal, no murmur, no gallop, rate regular.    Gilberto was seen today for insomnia.    Diagnoses and all orders for this visit:    Psychophysiological insomnia  -     temazepam (RESTORIL) 15 MG capsule; Take 1-2 capsules (15-30 mg) by mouth nightly as needed for sleep    Hypothyroidism due to Hashimoto's thyroiditis    Anxiety  -     sertraline (ZOLOFT) 50 MG tablet; Take 1/2 tab daily for 2 weeks, then increase to 1 full tab daily thereafter (take in the morning).    Primary insomnia  -     sertraline (ZOLOFT) 50 MG tablet; Take 1/2  tab daily for 2 weeks, then increase to 1 full tab daily thereafter (take in the morning).      work on lifestyle modification  Recheck in 3-4 wks.

## 2018-01-17 ENCOUNTER — TELEPHONE (OUTPATIENT)
Dept: FAMILY MEDICINE | Facility: CLINIC | Age: 58
End: 2018-01-17

## 2018-01-17 DIAGNOSIS — E06.3 HYPOTHYROIDISM DUE TO HASHIMOTO'S THYROIDITIS: Primary | ICD-10-CM

## 2018-01-17 RX ORDER — LEVOTHYROXINE SODIUM 25 UG/1
25 TABLET ORAL DAILY
Qty: 30 TABLET | Refills: 0 | Status: CANCELLED | OUTPATIENT
Start: 2018-01-17

## 2018-01-17 NOTE — TELEPHONE ENCOUNTER
Per patient she reports she went to an Endocrinologist and was instructed to take 2 tabs daily of the levothyroxine.  She reports this was discussed at 1/8/18 visit.  Patient needs a refill, when does she need labs rechecked?  Script note pended, unsure what does you would like?  Natividad Judge RN

## 2018-01-17 NOTE — TELEPHONE ENCOUNTER
Patient was ordered 10 week supply, she was to recheck TSH, 6-8 weeks from 12/25/17.  Left message on voice mail for patient to call clinic. [phone]  Natividad Judge RN

## 2018-01-18 RX ORDER — LEVOTHYROXINE SODIUM 50 UG/1
50 TABLET ORAL DAILY
Qty: 60 TABLET | Refills: 0 | Status: SHIPPED | OUTPATIENT
Start: 2018-01-18 | End: 2018-03-23

## 2018-01-18 NOTE — TELEPHONE ENCOUNTER
Patient notified and voiced understanding and agreement regarding medication.  Then patient had several questions regarding sleep and sleep medication, all questions answered.  Then she asked about vitamin lab work, informed that labs are ordered, lab appt scheduled.  Natividad Judge RN

## 2018-01-19 ENCOUNTER — DOCUMENTATION ONLY (OUTPATIENT)
Dept: LAB | Facility: CLINIC | Age: 58
End: 2018-01-19

## 2018-01-19 DIAGNOSIS — R42 DIZZY SPELLS: ICD-10-CM

## 2018-01-19 DIAGNOSIS — R42 DIZZY SPELLS: Primary | ICD-10-CM

## 2018-01-19 LAB
BASOPHILS # BLD AUTO: 0 10E9/L (ref 0–0.2)
BASOPHILS NFR BLD AUTO: 0.4 %
DIFFERENTIAL METHOD BLD: ABNORMAL
EOSINOPHIL # BLD AUTO: 0 10E9/L (ref 0–0.7)
EOSINOPHIL NFR BLD AUTO: 0.7 %
ERYTHROCYTE [DISTWIDTH] IN BLOOD BY AUTOMATED COUNT: 12.5 % (ref 10–15)
FOLATE SERPL-MCNC: 15.5 NG/ML
HCT VFR BLD AUTO: 37 % (ref 35–47)
HGB BLD-MCNC: 12.1 G/DL (ref 11.7–15.7)
LYMPHOCYTES # BLD AUTO: 0.7 10E9/L (ref 0.8–5.3)
LYMPHOCYTES NFR BLD AUTO: 25.6 %
MCH RBC QN AUTO: 32.1 PG (ref 26.5–33)
MCHC RBC AUTO-ENTMCNC: 32.7 G/DL (ref 31.5–36.5)
MCV RBC AUTO: 98 FL (ref 78–100)
MONOCYTES # BLD AUTO: 0.3 10E9/L (ref 0–1.3)
MONOCYTES NFR BLD AUTO: 11.2 %
NEUTROPHILS # BLD AUTO: 1.8 10E9/L (ref 1.6–8.3)
NEUTROPHILS NFR BLD AUTO: 62.1 %
PLATELET # BLD AUTO: 239 10E9/L (ref 150–450)
RBC # BLD AUTO: 3.77 10E12/L (ref 3.8–5.2)
WBC # BLD AUTO: 2.9 10E9/L (ref 4–11)

## 2018-01-19 PROCEDURE — 82306 VITAMIN D 25 HYDROXY: CPT | Performed by: FAMILY MEDICINE

## 2018-01-19 PROCEDURE — 82607 VITAMIN B-12: CPT | Performed by: FAMILY MEDICINE

## 2018-01-19 PROCEDURE — 85025 COMPLETE CBC W/AUTO DIFF WBC: CPT | Performed by: FAMILY MEDICINE

## 2018-01-19 PROCEDURE — 36415 COLL VENOUS BLD VENIPUNCTURE: CPT | Performed by: FAMILY MEDICINE

## 2018-01-19 PROCEDURE — 82746 ASSAY OF FOLIC ACID SERUM: CPT | Performed by: FAMILY MEDICINE

## 2018-01-19 NOTE — TELEPHONE ENCOUNTER
CBC a month ago was normal. Will add lab per patient request but recommend an office visit to evaluate the dizziness.

## 2018-01-19 NOTE — PROGRESS NOTES
Please review & confirm orders. Patient was in today for her lab appointment & wants you to add a CBC because she said she is always dizzy. JIC tubes drawn.    Thank you,  Kim Meza

## 2018-01-19 NOTE — PROGRESS NOTES
Not seeing that this has ever been addressed with Dr. Rocha- routing to provider.  Please advise.

## 2018-01-20 LAB — VIT B12 SERPL-MCNC: 244 PG/ML (ref 193–986)

## 2018-01-21 LAB — DEPRECATED CALCIDIOL+CALCIFEROL SERPL-MC: 11 UG/L (ref 20–75)

## 2018-01-23 ENCOUNTER — TELEPHONE (OUTPATIENT)
Dept: FAMILY MEDICINE | Facility: CLINIC | Age: 58
End: 2018-01-23

## 2018-01-24 DIAGNOSIS — E55.9 VITAMIN D DEFICIENCY: Primary | ICD-10-CM

## 2018-01-24 DIAGNOSIS — D72.819 LEUKOPENIA, UNSPECIFIED TYPE: ICD-10-CM

## 2018-01-24 RX ORDER — ERGOCALCIFEROL 1.25 MG/1
50000 CAPSULE, LIQUID FILLED ORAL WEEKLY
Qty: 8 CAPSULE | Refills: 0 | Status: SHIPPED | OUTPATIENT
Start: 2018-01-24 | End: 2018-03-15

## 2018-02-13 DIAGNOSIS — E06.3 HYPOTHYROIDISM DUE TO HASHIMOTO'S THYROIDITIS: Primary | ICD-10-CM

## 2018-02-13 LAB — TSH SERPL DL<=0.005 MIU/L-ACNC: 3.34 MU/L (ref 0.4–4)

## 2018-02-13 PROCEDURE — 84443 ASSAY THYROID STIM HORMONE: CPT | Performed by: PHYSICIAN ASSISTANT

## 2018-02-13 PROCEDURE — 36415 COLL VENOUS BLD VENIPUNCTURE: CPT | Performed by: PHYSICIAN ASSISTANT

## 2018-02-13 NOTE — LETTER
February 14, 2018      Gilberto Bauman  [address] [address] [place] [phone]        Dear ,    We are writing to inform you of your test results.    Your tsh is now within therapeutic range. Continue your current dose of your levothyroxine. Another tsh she be checked in 6 mos .     Resulted Orders   TSH   Result Value Ref Range    TSH 3.34 0.40 - 4.00 mU/L       If you have any questions or concerns, please call the clinic at the number listed above.       Sincerely,        Scott Echeverria PA-C/darcie

## 2018-03-23 ENCOUNTER — TELEPHONE (OUTPATIENT)
Dept: FAMILY MEDICINE | Facility: CLINIC | Age: 58
End: 2018-03-23

## 2018-03-23 DIAGNOSIS — E06.3 HYPOTHYROIDISM DUE TO HASHIMOTO'S THYROIDITIS: ICD-10-CM

## 2018-03-23 RX ORDER — LEVOTHYROXINE SODIUM 50 UG/1
50 TABLET ORAL DAILY
Qty: 90 TABLET | Refills: 0 | Status: SHIPPED | OUTPATIENT
Start: 2018-03-23 | End: 2018-08-28

## 2018-03-23 NOTE — TELEPHONE ENCOUNTER
Prescription approved per [facility] Refill Protocol.  Patient informed.        Notes Recorded by Scott Echeverria PA-C on 2/14/2018 at 6:50 AM  Gilberto,  Your tsh is now within therapeutic range. Continue your current dose of your levothyroxine. Another tsh she be checked in 6 mos .    Scott

## 2018-08-13 ENCOUNTER — TELEPHONE (OUTPATIENT)
Dept: FAMILY MEDICINE | Facility: CLINIC | Age: 58
End: 2018-08-13

## 2018-08-13 DIAGNOSIS — E06.3 HYPOTHYROIDISM DUE TO HASHIMOTO'S THYROIDITIS: Primary | ICD-10-CM

## 2018-08-13 NOTE — TELEPHONE ENCOUNTER
Result Notes   Notes Recorded by Rose Muñiz on 2/14/2018 at 7:37 AM  Letter and result mailed to pt. STEVE Poole    ------    Notes Recorded by Scott Echeverria PA-C on 2/14/2018 at 6:50 AM  Gilberto,  Your tsh is now within therapeutic range. Continue your current dose of your levothyroxine. Another tsh she be checked in 6 mos .    Scott     Copied from last TSH check.    Routing to Scott Echeverria to place order for patient if appropriate.    Osmin Bradley MA

## 2018-08-14 DIAGNOSIS — E06.3 HYPOTHYROIDISM DUE TO HASHIMOTO'S THYROIDITIS: ICD-10-CM

## 2018-08-14 LAB — TSH SERPL DL<=0.005 MIU/L-ACNC: 13.83 MU/L (ref 0.4–4)

## 2018-08-14 PROCEDURE — 84443 ASSAY THYROID STIM HORMONE: CPT | Performed by: PHYSICIAN ASSISTANT

## 2018-08-14 PROCEDURE — 36415 COLL VENOUS BLD VENIPUNCTURE: CPT | Performed by: PHYSICIAN ASSISTANT

## 2018-08-15 ENCOUNTER — TELEPHONE (OUTPATIENT)
Dept: FAMILY MEDICINE | Facility: CLINIC | Age: 58
End: 2018-08-15

## 2018-08-15 NOTE — LETTER
August 15, 2018      Gilberto Bauman  [address] [address] [place] [phone]        Dear Gilberto,     We at [facility] are trying to provide the best care for our patients.   Upon your doctor reviewing your chart, it appears that you are due for your mammogram.  We have taken the liberty of ordering this for you.   Please call the clinic at [phone] to schedule your mammogram appointment.  If you have already had the above completed, please have the records faxed to us at [phone].   Or call us at the clinic with the following;  -Where this was completed (clinic name or imaging center)  -When was this done (estimated date)  -Result (normal/abnormal)  ?  Thank You,  [facility]          Sincerely,        Scott Echeverria PA-C

## 2018-08-15 NOTE — LETTER
August 15, 2018      Gilberto Bauman  [address] [address] [place] [phone]        {Comm Man dear:[id number]}          Sincerely,        Scott Echeverria PA-C

## 2018-08-22 NOTE — TELEPHONE ENCOUNTER
She is due for a recheck of her depression/anxiety. Have her return to see me on Thursday or Friday . Will discuss her latest lab results at her appt with me.

## 2018-08-28 ENCOUNTER — OFFICE VISIT (OUTPATIENT)
Dept: FAMILY MEDICINE | Facility: CLINIC | Age: 58
End: 2018-08-28
Payer: COMMERCIAL

## 2018-08-28 VITALS
DIASTOLIC BLOOD PRESSURE: 83 MMHG | BODY MASS INDEX: 30.66 KG/M2 | TEMPERATURE: 97.8 F | HEART RATE: 78 BPM | HEIGHT: 65 IN | OXYGEN SATURATION: 100 % | WEIGHT: 184 LBS | SYSTOLIC BLOOD PRESSURE: 120 MMHG

## 2018-08-28 DIAGNOSIS — B34.9 VIRAL SYNDROME: Primary | ICD-10-CM

## 2018-08-28 DIAGNOSIS — E06.3 HYPOTHYROIDISM DUE TO HASHIMOTO'S THYROIDITIS: ICD-10-CM

## 2018-08-28 DIAGNOSIS — J02.0 STREPTOCOCCAL SORE THROAT: ICD-10-CM

## 2018-08-28 PROBLEM — F41.9 ANXIETY: Status: RESOLVED | Noted: 2018-01-04 | Resolved: 2018-08-28

## 2018-08-28 LAB
DEPRECATED S PYO AG THROAT QL EIA: NORMAL
SPECIMEN SOURCE: NORMAL

## 2018-08-28 PROCEDURE — 87081 CULTURE SCREEN ONLY: CPT | Performed by: PHYSICIAN ASSISTANT

## 2018-08-28 PROCEDURE — 99214 OFFICE O/P EST MOD 30 MIN: CPT | Performed by: PHYSICIAN ASSISTANT

## 2018-08-28 PROCEDURE — 87880 STREP A ASSAY W/OPTIC: CPT | Performed by: PHYSICIAN ASSISTANT

## 2018-08-28 RX ORDER — LEVOTHYROXINE SODIUM 50 UG/1
50 TABLET ORAL DAILY
Qty: 90 TABLET | Refills: 0 | Status: SHIPPED | OUTPATIENT
Start: 2018-08-28 | End: 2019-01-14

## 2018-08-28 NOTE — MR AVS SNAPSHOT
"              After Visit Summary   2018    Gilberto Bauman    MRN: [id number]           Patient Information     Date Of Birth          1960        Visit Information        Provider Department      2018 9:00 AM Scott Echeverria PA-C [facility]        Today's Diagnoses     Viral syndrome    -  1    Streptococcal sore throat        Hypothyroidism due to Hashimoto's thyroiditis           Follow-ups after your visit        Future tests that were ordered for you today     Open Future Orders        Priority Expected Expires Ordered    TSH Routine  10/28/2018 2018            Who to contact     Normal or non-critical lab and imaging results will be communicated to you by [employer], letter or phone within 4 business days after the clinic has received the results. If you do not hear from us within 7 days, please contact the clinic through [employer] or phone. If you have a critical or abnormal lab result, we will notify you by phone as soon as possible.  Submit refill requests through [employer] or call your pharmacy and they will forward the refill request to us. Please allow 3 business days for your refill to be completed.          If you need to speak with a  for additional information , please call: [phone]             Additional Information About Your Visit        [employer] Information     [employer] lets you send messages to your doctor, view your test results, renew your prescriptions, schedule appointments and more. To sign up, go to [email or web address] . Click on \"Log in\" on the left side of the screen, which will take you to the Welcome page. Then click on \"Sign up Now\" on the right side of the page.     You will be asked to enter the access code listed below, as well as some personal information. Please follow the directions to create your username and password.     Your access code is: [email or web address]  Expires: 2018  9:30 AM     Your access code will  in 90 days. " "If you need help or a new code, please call your [facility] clinic or [phone].        Care EveryWhere ID     This is your Care EveryWhere ID. This could be used by other organizations to access your [facility] medical records  [id number]        Your Vitals Were     Pulse Temperature Height Pulse Oximetry BMI (Body Mass Index)       78 97.8  F (36.6  C) (Oral) 5' 4.5\" (1.638 m) 100% 31.1 kg/m2        Blood Pressure from Last 3 Encounters:   08/28/18 120/83   01/08/18 129/85   01/04/18 112/60    Weight from Last 3 Encounters:   08/28/18 184 lb (83.5 kg)   01/08/18 178 lb (80.7 kg)   01/04/18 180 lb 3.2 oz (81.7 kg)              We Performed the Following     Strep, Rapid Screen          Where to get your medicines      These medications were sent to [facility] [address] [place] [address] [facility] [address] "[employer] [facility]  [address] , [facility] [address]    Hours:  24-hours Phone:  [phone]     levothyroxine 50 MCG tablet          Primary Care Provider Office Phone # Fax #    Scott Echeverria PA-C [phone]       [address] [place] [address]        Equal Access to Services     [facility] AH: Hadii aad ku hadasho Soomaali, waaxda luqadaha, qaybta kaalmada adeegyada, uzma sanchez haylakhwinder paula . So [facility] [place] [phone].    ATENCIÓN: Si habla español, tiene a arechiga disposición servicios gratuitos de asistencia lingüística. Llame al [phone].    We comply with applicable federal civil rights laws and [place] laws. We do not discriminate on the basis of race, color, national origin, age, disability, sex, sexual orientation, or gender identity.            Thank you!     Thank you for choosing [facility]  for your care. Our goal is always to provide you with excellent care. Hearing back from our patients is one way we can continue to improve our services. Please take a few minutes to complete the written survey that you " may receive in the mail after your visit with us. Thank you!             Your Updated Medication List - Protect others around you: Learn how to safely use, store and throw away your medicines at www.disposemymeds.org.          This list is accurate as of 8/28/18  9:30 AM.  Always use your most recent med list.                   Brand Name Dispense Instructions for use Diagnosis    levothyroxine 50 MCG tablet    SYNTHROID/LEVOTHROID    90 tablet    Take 1 tablet (50 mcg) by mouth daily    Hypothyroidism due to Hashimoto's thyroiditis

## 2018-08-28 NOTE — PROGRESS NOTES
SUBJECTIVE:   Gilberto Bauman is a 57 year old female who presents to clinic today for the following health issues:      RESPIRATORY SYMPTOMS:       Duration: 2 days    Description  nasal congestion and dry itchy throat, cough    Severity: mild    Accompanying signs and symptoms: None    History (predisposing factors):  none    Precipitating or alleviating factors: None    Therapies tried and outcome:  rest and fluids      Patient would also like to discuss thyroid labs. tsh is elevated again. Patient has not taken her levothyroxine for months. She has been advised to restart it and recheck a tsh again in 3 mos.   KAMI Mandujano      Problem list and histories reviewed & adjusted, as indicated.  Additional history: as documented    Patient Active Problem List   Diagnosis     CARDIOVASCULAR SCREENING; LDL GOAL LESS THAN 160     Vitamin D deficiency     Anemia, unspecified anemia type     Hypothyroidism due to Hashimoto's thyroiditis     Past Surgical History:   Procedure Laterality Date     NO HISTORY OF SURGERY         Social History   Substance Use Topics     Smoking status: Never Smoker     Smokeless tobacco: Never Used     Alcohol use No     Family History   Problem Relation Age of Onset     Diabetes No family hx of      Coronary Artery Disease No family hx of      Hypertension No family hx of      Hyperlipidemia No family hx of      Cerebrovascular Disease No family hx of      Breast Cancer No family hx of      Colon Cancer No family hx of      Thyroid Disease No family hx of          Current Outpatient Prescriptions   Medication Sig Dispense Refill     levothyroxine (SYNTHROID/LEVOTHROID) 50 MCG tablet Take 1 tablet (50 mcg) by mouth daily 90 tablet 0     [DISCONTINUED] levothyroxine (SYNTHROID/LEVOTHROID) 50 MCG tablet Take 1 tablet (50 mcg) by mouth daily 90 tablet 0     Allergies   Allergen Reactions     Amoxicillin      Rash     Recent Labs   Lab Test  08/14/18   0937  02/13/18   1626   01/05/16   1047   11/27/13   1150   LDL   --    --    --   105*  132*   HDL   --    --    --   42*  54   TRIG   --    --    --   145  126   TSH  13.83*  3.34   < >  7.04*   --     < > = values in this interval not displayed.      BP Readings from Last 3 Encounters:   08/28/18 120/83   01/08/18 129/85   01/04/18 112/60    Wt Readings from Last 3 Encounters:   08/28/18 184 lb (83.5 kg)   01/08/18 178 lb (80.7 kg)   01/04/18 180 lb 3.2 oz (81.7 kg)                  Labs reviewed in EPIC    Reviewed and updated as needed this visit by clinical staff  Tobacco  Allergies  Meds  Med Hx  Surg Hx  Fam Hx  Soc Hx      Reviewed and updated as needed this visit by Provider         All other systems negative except as outline above  OBJECTIVE:  ENT exam reveals - ENT exam normal, no neck nodes or sinus tenderness, bilateral TM fluid noted, neck without nodes, throat normal without erythema or exudate, sinuses nontender, post nasal drip noted, nasal mucosa congested and nasal mucosa pale and congested.  CHEST:chest clear to IPPA, no tachypnea, retractions or cyanosis and S1, S2 normal, no murmur, no gallop, rate regular.  Thyroid not palpable, not enlarged, no nodules detected.  Eye exam - right eye normal lid, conjunctiva, cornea, pupil and fundus, left eye normal lid, conjunctiva, cornea, pupil and fundus.    Gilberto was seen today for thyroid problem.    Diagnoses and all orders for this visit:    Viral syndrome    Streptococcal sore throat  -     Strep, Rapid Screen    Hypothyroidism due to Hashimoto's thyroiditis  -     levothyroxine (SYNTHROID/LEVOTHROID) 50 MCG tablet; Take 1 tablet (50 mcg) by mouth daily  -     TSH; Future      Advised supportive and symptomatic treatment.  Follow up with Provider - if condition persists or worsens.   work on lifestyle modification  Recheck a tsh in 2-3 mos

## 2018-08-29 LAB
BACTERIA SPEC CULT: NORMAL
SPECIMEN SOURCE: NORMAL

## 2019-02-20 DIAGNOSIS — E06.3 HYPOTHYROIDISM DUE TO HASHIMOTO'S THYROIDITIS: ICD-10-CM

## 2019-02-20 NOTE — TELEPHONE ENCOUNTER
"Requested Prescriptions   Pending Prescriptions Disp Refills     levothyroxine (SYNTHROID/LEVOTHROID) 50 MCG tablet [Pharmacy Med Name: LEVOTHYROXINE 0.05MG (50MCG) TAB] 30 tablet 0    Last Written Prescription Date:  01/16/19  Last Fill Quantity: 30,  # refills: 0   Last office visit: 8/28/2018 with prescribing provider:  NELLIE Echeverria Future Office Visit:     Sig: TAKE 1 TABLET(50 MCG) BY MOUTH DAILY    Thyroid Protocol Failed - 2/20/2019 10:18 AM       Failed - Normal TSH on file in past 12 months    Recent Labs   Lab Test 08/14/18  0937   TSH 13.83*             Passed - Patient is 12 years or older       Passed - Recent (12 mo) or future (30 days) visit within the authorizing provider's specialty    Patient had office visit in the last 12 months or has a visit in the next 30 days with authorizing provider or within the authorizing provider's specialty.  See \"Patient Info\" tab in inbasket, or \"Choose Columns\" in Meds & Orders section of the refill encounter.             Passed - Medication is active on med list       Passed - No active pregnancy on record    If patient is pregnant or has had a positive pregnancy test, please check TSH.         Passed - No positive pregnancy test in past 12 months    If patient is pregnant or has had a positive pregnancy test, please check TSH.            "

## 2019-02-20 NOTE — TELEPHONE ENCOUNTER
Routing refill request to provider for review/approval because:  Theodora given x1 and patient did not follow up, please advise    Shala Vanegas RN, BSN, PHN

## 2019-02-21 RX ORDER — LEVOTHYROXINE SODIUM 50 UG/1
TABLET ORAL
Qty: 30 TABLET | Refills: 0 | Status: SHIPPED | OUTPATIENT
Start: 2019-02-21 | End: 2019-03-28

## 2019-03-28 DIAGNOSIS — E06.3 HYPOTHYROIDISM DUE TO HASHIMOTO'S THYROIDITIS: ICD-10-CM

## 2019-03-28 NOTE — TELEPHONE ENCOUNTER
"Requested Prescriptions   Pending Prescriptions Disp Refills     levothyroxine (SYNTHROID/LEVOTHROID) 50 MCG tablet [Pharmacy Med Name: LEVOTHYROXINE 0.05MG (50MCG) TAB] 30 tablet 0    Last Written Prescription Date:  2-21-19  Last Fill Quantity: 30,  # refills: 0   Last office visit: 8/28/2018 with prescribing provider:  8-28-18   Future Office Visit:   Sig: TAKE 1 TABLET(50 MCG) BY MOUTH DAILY    Thyroid Protocol Failed - 3/28/2019  8:22 AM       Failed - Normal TSH on file in past 12 months    Recent Labs   Lab Test 08/14/18  0937   TSH 13.83*             Passed - Patient is 12 years or older       Passed - Recent (12 mo) or future (30 days) visit within the authorizing provider's specialty    Patient had office visit in the last 12 months or has a visit in the next 30 days with authorizing provider or within the authorizing provider's specialty.  See \"Patient Info\" tab in inbasket, or \"Choose Columns\" in Meds & Orders section of the refill encounter.             Passed - Medication is active on med list       Passed - No active pregnancy on record    If patient is pregnant or has had a positive pregnancy test, please check TSH.         Passed - No positive pregnancy test in past 12 months    If patient is pregnant or has had a positive pregnancy test, please check TSH.          "

## 2019-03-28 NOTE — LETTER
March 29, 2019        Gilberto Bauman  [address] [address] [place] [phone]      Dear Gilberto,    Your medication has been approved for levothyroxine (SYNTHROID/LEVOTHROID) 50 MCG tablet for one month only.    However, you are due for a lab appointment for further refills. Please schedule this visit at your earliest convenience.    Thank you.      Scott Echeverria PA-C/darcie

## 2019-03-29 RX ORDER — LEVOTHYROXINE SODIUM 50 UG/1
TABLET ORAL
Qty: 30 TABLET | Refills: 0 | Status: SHIPPED | OUTPATIENT
Start: 2019-03-29 | End: 2019-05-03

## 2019-05-03 DIAGNOSIS — E06.3 HYPOTHYROIDISM DUE TO HASHIMOTO'S THYROIDITIS: ICD-10-CM

## 2019-05-03 NOTE — TELEPHONE ENCOUNTER
"Requested Prescriptions   Pending Prescriptions Disp Refills     levothyroxine (SYNTHROID/LEVOTHROID) 50 MCG tablet [Pharmacy Med Name: LEVOTHYROXINE 0.05MG (50MCG) TAB] 30 tablet 0     Sig: TAKE 1 TABLET(50 MCG) BY MOUTH DAILY  Last Written Prescription Date:  3/29/19  Last Fill Quantity: 30,  # refills: 0   Last office visit: 8/28/2018 with prescribing provider:  WALLY Echeverria   Future Office Visit:         Thyroid Protocol Failed - 5/3/2019  7:47 AM        Failed - Normal TSH on file in past 12 months     Recent Labs   Lab Test 08/14/18  0937   TSH 13.83*              Passed - Patient is 12 years or older        Passed - Recent (12 mo) or future (30 days) visit within the authorizing provider's specialty     Patient had office visit in the last 12 months or has a visit in the next 30 days with authorizing provider or within the authorizing provider's specialty.  See \"Patient Info\" tab in inbasket, or \"Choose Columns\" in Meds & Orders section of the refill encounter.              Passed - Medication is active on med list        Passed - No active pregnancy on record     If patient is pregnant or has had a positive pregnancy test, please check TSH.          Passed - No positive pregnancy test in past 12 months     If patient is pregnant or has had a positive pregnancy test, please check TSH.          "

## 2019-05-03 NOTE — TELEPHONE ENCOUNTER
Routing refill request to provider for review/approval because:  Theodora given x 3 and patient did not follow up, please advise. Was to repeat labs 10/2019    Pended for approval for 15 tabs

## 2019-05-04 RX ORDER — LEVOTHYROXINE SODIUM 50 UG/1
TABLET ORAL
Qty: 15 TABLET | Refills: 0 | Status: SHIPPED | OUTPATIENT
Start: 2019-05-04 | End: 2021-01-19

## 2019-12-24 ENCOUNTER — NURSE TRIAGE (OUTPATIENT)
Dept: NURSING | Facility: CLINIC | Age: 59
End: 2019-12-24

## 2019-12-24 NOTE — TELEPHONE ENCOUNTER
Reason for Disposition    Health Information question, no triage required and triager able to answer question    Additional Information    Negative: [1] Caller is not with the adult (patient) AND [2] reporting urgent symptoms    Negative: Lab result questions    Negative: Medication questions    Negative: Caller can't be reached by phone    Negative: Caller has already spoken to PCP or another triager    Negative: Requesting regular office appointment    Negative: [1] Caller requesting NON-URGENT health information AND [2] PCP's office is the best resource    Protocols used: INFORMATION ONLY CALL-A-    Patient reports that she has stitches in her finger that need to be removed and wonders if this can be done today.  Writer advised patient that the nearest, open, [facility] center to her is in [place] and advised patient of the clinic hours for the holiday.  Patient reports that she will attempt to find another location nearer to her to have the stitches removed.    Catherine Darden RN  [facility]

## 2019-12-27 ENCOUNTER — OFFICE VISIT (OUTPATIENT)
Dept: ORTHOPEDICS | Facility: CLINIC | Age: 59
End: 2019-12-27

## 2019-12-27 ENCOUNTER — ANCILLARY PROCEDURE (OUTPATIENT)
Dept: GENERAL RADIOLOGY | Facility: CLINIC | Age: 59
End: 2019-12-27
Attending: FAMILY MEDICINE

## 2019-12-27 VITALS
BODY MASS INDEX: 28.25 KG/M2 | DIASTOLIC BLOOD PRESSURE: 62 MMHG | WEIGHT: 180 LBS | HEIGHT: 67 IN | SYSTOLIC BLOOD PRESSURE: 110 MMHG

## 2019-12-27 DIAGNOSIS — S69.91XA FINGER INJURY, RIGHT, INITIAL ENCOUNTER: ICD-10-CM

## 2019-12-27 DIAGNOSIS — S62.650B OPEN NONDISPLACED FRACTURE OF MIDDLE PHALANX OF RIGHT INDEX FINGER, INITIAL ENCOUNTER: Primary | ICD-10-CM

## 2019-12-27 PROCEDURE — 73140 X-RAY EXAM OF FINGER(S): CPT | Mod: RT

## 2019-12-27 PROCEDURE — 99204 OFFICE O/P NEW MOD 45 MIN: CPT | Performed by: FAMILY MEDICINE

## 2019-12-27 ASSESSMENT — MIFFLIN-ST. JEOR: SCORE: 1434.1

## 2019-12-27 NOTE — PROGRESS NOTES
ASSESSMENT & PLAN  Gilberto was seen today for pain.    Diagnoses and all orders for this visit:    Open nondisplaced fracture of middle phalanx of right index finger, initial encounter    Finger injury, right, initial encounter  -     XR Finger Right G/E 2 Views; Future    Patient is a 59 year old female presenting for evaluation of   Chief Complaint   Patient presents with     Right Index Finger - Pain      # Open Right Index Finger Fracture: Notable after finger getting caught in a door 11 days ago.  Pain and swelling improved.  Lacerations healing well, no evidence of infection currently.  Sutures removed today.  XR showing stable alignment and evidence of healing.  Plan to cont brace for 2 weeks and f/u for repeat evaluation and xrays  Treatment: Cont bracing, sutures removed  Physical Therapy None, if not improved can refer for formal PT  Medications  Limited NSAIDs/Tylenol    Concerning signs/sx that would warrant urgent evaluation were discussed.  All questions were answered, patient understands and agrees with plan.      Return in about 2 weeks (around 1/10/2020).    -----     SUBJECTIVE  Gilberto D Mitul is a/an 59 year old Right handed female who is seen as a self referral for evaluation of right hand pain. The patient is seen by themselves.    Onset: 12/16/19, 11 day(s) ago. Patient describes injury as caught finger in a closing door.  She presented to [facility] ED 12/16/19 for finger pain and laceration.  She presented to [facility] on 12/24/19 and 2 sutures were removed.  Location of Pain: right index finger   Rating of Pain at worst: mild  Rating of Pain Currently: none   Worsened by: flexion (tightness)   Better with: nothing   Treatments tried: sutures, alumofoam splint    Associated symptoms: swelling  Orthopedic history: NO  Relevant surgical history: NO  Past Medical History:   Diagnosis Date     Vitamin D deficiency      Social History     Socioeconomic History     Marital status:      Spouse  name: Raul     Number of children: 6     Years of education: 14     Highest education level: Not on file   Occupational History     Employer: UNEMPLOYED   Social Needs     Financial resource strain: Not on file     Food insecurity:     Worry: Not on file     Inability: Not on file     Transportation needs:     Medical: Not on file     Non-medical: Not on file   Tobacco Use     Smoking status: Never Smoker     Smokeless tobacco: Never Used   Substance and Sexual Activity     Alcohol use: No     Drug use: No     Sexual activity: Yes     Partners: Male     Birth control/protection: None   Lifestyle     Physical activity:     Days per week: Not on file     Minutes per session: Not on file     Stress: Not on file   Relationships     Social connections:     Talks on phone: Not on file     Gets together: Not on file     Attends Adventism service: Not on file     Active member of club or organization: Not on file     Attends meetings of clubs or organizations: Not on file     Relationship status: Not on file     Intimate partner violence:     Fear of current or ex partner: Not on file     Emotionally abused: Not on file     Physically abused: Not on file     Forced sexual activity: Not on file   Other Topics Concern     Parent/sibling w/ CABG, MI or angioplasty before 65F 55M? Not Asked   Social History Narrative    Lives with her  and 6 children at home.         Patient's past medical, surgical, social, and family histories were reviewed today and no changes are noted.  No family history pertinent to the patient's problem today      REVIEW OF SYSTEMS:  10 point ROS is negative other than symptoms noted above in HPI, Past Medical History or as stated below  Constitutional: NEGATIVE for fever, chills, change in weight  Skin: NEGATIVE for worrisome rashes, moles or lesions  GI/: NEGATIVE for bowel or bladder changes  Neuro: NEGATIVE for weakness, dizziness or paresthesias    OBJECTIVE:  /62   Ht 1.702 m  "(5' 7\")   Wt 81.6 kg (180 lb)   BMI 28.19 kg/m     General: healthy, alert and in no distress  HEENT: no scleral icterus or conjunctival erythema  Skin: no suspicious lesions or rash. No jaundice.  CV: regular rhythm by palpation  Resp: normal respiratory effort without conversational dyspnea   Psych: normal mood and affect  Gait: normal steady gait with appropriate coordination and balance  Neuro: normal light touch sensory exam of the bilateral hands.    MSK:  RIGHT HAND  Inspection:  Swelling over middle phalanx of 2nd digit  Palpation:   Carpals: normal   Metacarpals: normal   Thumb: normal   Fingers:   Range of Motion:  Limited flex/ext of DIP/PIP of 2nd digit otherwise intact ROM of other digits  Strength:  Intact but limited flex/ext at DIP and PIP of 2nd digit 2/2 pain otherwise full strength in other digits  Special Tests:    Positive: none    Negative: flexor digitorum superficialis testing, flexor digitorum profundus testing    7 Stitches removed    [unfilled]    Suture removal  Date/Time: 1/2/2020 2:30 PM  Performed by: Ashwin Baez MD  Authorized by: Ashwin Baez MD   Body area: upper extremity  Location details: right index finger    UNIVERSAL PROTOCOL   Site Marked: Yes  Prior Images Obtained and Reviewed:  Yes  Required items: Required blood products, implants, devices and special equipment available    Patient identity confirmed:  Verbally with patient  NA - No sedation, light sedation, or local anesthesia  Confirmation Checklist:  Patient's identity using two indicators  Time out: Immediately prior to the procedure a time out was called    Universal Protocol: the Joint Commission Universal Protocol was followed    Preparation: Patient was prepped and draped in usual sterile fashion    ESBL (mL):  0        Wound Appearance: clean  Sutures Removed: 7  Post-removal: antibiotic ointment applied    PROCEDURE   Patient Tolerance:  Patient tolerated the procedure well with no immediate " complications     6 sutures removed on dorsal side, 1 on volar side  Length of time physician/provider present for 1:1 monitoring during sedation: 0      Independent visualization of the below image:  No results found for this or any previous visit (from the past 24 hour(s)).     XR FINGER RT G/E 2 VW 12/27/2019 11:01 AM      HISTORY: Finger injury, right, initial encounter                                                                   IMPRESSION: Nondisplaced slightly comminuted diaphyseal fracture of  the index finger middle phalanx. Soft tissue swelling is noted.     MD Ashwin SOLORZANO MD [facility]

## 2019-12-27 NOTE — LETTER
12/27/2019         RE: Gilberto Bauman  [address] [facility] [address]        Dear Colleague,    Thank you for referring your patient, Gilberto Bauman, to the [facility]. Please see a copy of my visit note below.    ASSESSMENT & PLAN  Gilberto was seen today for pain.    Diagnoses and all orders for this visit:    Open nondisplaced fracture of middle phalanx of right index finger, initial encounter    Finger injury, right, initial encounter  -     XR Finger Right G/E 2 Views; Future    Patient is a 59 year old female presenting for evaluation of   Chief Complaint   Patient presents with     Right Index Finger - Pain      # Open Right Index Finger Fracture: Notable after finger getting caught in a door 11 days ago.  Pain and swelling improved.  Lacerations healing well, no evidence of infection currently.  Sutures removed today.  XR showing stable alignment and evidence of healing.  Plan to cont brace for 2 weeks and f/u for repeat evaluation and xrays  Treatment: Cont bracing, sutures removed  Physical Therapy None, if not improved can refer for formal PT  Medications  Limited NSAIDs/Tylenol    Concerning signs/sx that would warrant urgent evaluation were discussed.  All questions were answered, patient understands and agrees with plan.      Return in about 2 weeks (around 1/10/2020).    -----     SUBJECTIVE  Gilberto Bauman is a/an 59 year old Right handed female who is seen as a self referral for evaluation of right hand pain. The patient is seen by themselves.    Onset: 12/16/19, 11 day(s) ago. Patient describes injury as caught finger in a closing door.  She presented to [facility] ED 12/16/19 for finger pain and laceration.  She presented to [facility] on 12/24/19 and 2 sutures were removed.  Location of Pain: right index finger   Rating of Pain at worst: mild  Rating of Pain Currently: none   Worsened by: flexion (tightness)   Better with: nothing   Treatments tried: sutures,  alumofoam splint    Associated symptoms: swelling  Orthopedic history: NO  Relevant surgical history: NO  Past Medical History:   Diagnosis Date     Vitamin D deficiency      Social History     Socioeconomic History     Marital status:      Spouse name: Raul     Number of children: 6     Years of education: 14     Highest education level: Not on file   Occupational History     Employer: UNEMPLOYED   Social Needs     Financial resource strain: Not on file     Food insecurity:     Worry: Not on file     Inability: Not on file     Transportation needs:     Medical: Not on file     Non-medical: Not on file   Tobacco Use     Smoking status: Never Smoker     Smokeless tobacco: Never Used   Substance and Sexual Activity     Alcohol use: No     Drug use: No     Sexual activity: Yes     Partners: Male     Birth control/protection: None   Lifestyle     Physical activity:     Days per week: Not on file     Minutes per session: Not on file     Stress: Not on file   Relationships     Social connections:     Talks on phone: Not on file     Gets together: Not on file     Attends Protestant service: Not on file     Active member of club or organization: Not on file     Attends meetings of clubs or organizations: Not on file     Relationship status: Not on file     Intimate partner violence:     Fear of current or ex partner: Not on file     Emotionally abused: Not on file     Physically abused: Not on file     Forced sexual activity: Not on file   Other Topics Concern     Parent/sibling w/ CABG, MI or angioplasty before 65F 55M? Not Asked   Social History Narrative    Lives with her  and 6 children at home.         Patient's past medical, surgical, social, and family histories were reviewed today and no changes are noted.  No family history pertinent to the patient's problem today      REVIEW OF SYSTEMS:  10 point ROS is negative other than symptoms noted above in HPI, Past Medical History or as stated  "below  Constitutional: NEGATIVE for fever, chills, change in weight  Skin: NEGATIVE for worrisome rashes, moles or lesions  GI/: NEGATIVE for bowel or bladder changes  Neuro: NEGATIVE for weakness, dizziness or paresthesias    OBJECTIVE:  /62   Ht 1.702 m (5' 7\")   Wt 81.6 kg (180 lb)   BMI 28.19 kg/m      General: healthy, alert and in no distress  HEENT: no scleral icterus or conjunctival erythema  Skin: no suspicious lesions or rash. No jaundice.  CV: regular rhythm by palpation  Resp: normal respiratory effort without conversational dyspnea   Psych: normal mood and affect  Gait: normal steady gait with appropriate coordination and balance  Neuro: normal light touch sensory exam of the bilateral hands.    MSK:  RIGHT HAND  Inspection:  Swelling over middle phalanx of 2nd digit  Palpation:   Carpals: normal   Metacarpals: normal   Thumb: normal   Fingers:   Range of Motion:  Limited flex/ext of DIP/PIP of 2nd digit otherwise intact ROM of other digits  Strength:  Intact but limited flex/ext at DIP and PIP of 2nd digit 2/2 pain otherwise full strength in other digits  Special Tests:    Positive: none    Negative: flexor digitorum superficialis testing, flexor digitorum profundus testing    7 Stitches removed    [unfilled]    Suture removal  Date/Time: 1/2/2020 2:30 PM  Performed by: Ashwin Baez MD  Authorized by: Ashwin Baez MD   Body area: upper extremity  Location details: right index finger    UNIVERSAL PROTOCOL   Site Marked: Yes  Prior Images Obtained and Reviewed:  Yes  Required items: Required blood products, implants, devices and special equipment available    Patient identity confirmed:  Verbally with patient  NA - No sedation, light sedation, or local anesthesia  Confirmation Checklist:  Patient's identity using two indicators  Time out: Immediately prior to the procedure a time out was called    Universal Protocol: the Joint Commission Universal Protocol was followed  "   Preparation: Patient was prepped and draped in usual sterile fashion    ESBL (mL):  0        Wound Appearance: clean  Sutures Removed: 7  Post-removal: antibiotic ointment applied    PROCEDURE   Patient Tolerance:  Patient tolerated the procedure well with no immediate complications     6 sutures removed on dorsal side, 1 on volar side  Length of time physician/provider present for 1:1 monitoring during sedation: 0      Independent visualization of the below image:  No results found for this or any previous visit (from the past 24 hour(s)).     XR FINGER RT G/E 2 VW 12/27/2019 11:01 AM      HISTORY: Finger injury, right, initial encounter                                                                   IMPRESSION: Nondisplaced slightly comminuted diaphyseal fracture of  the index finger middle phalanx. Soft tissue swelling is noted.     MD Ashwin SOLORZANO MD [facility]        Again, thank you for allowing me to participate in the care of your patient.        Sincerely,        Ashwin Baez MD

## 2019-12-27 NOTE — PATIENT INSTRUCTIONS
Patient Education     Finger Fracture, Open  You have a broken finger (fracture) with a nearby cut, puncture, or deep scrape. This causes local pain, swelling, and bruising. Because of the open injury, you are at risk for infection in the skin and bone. You will take antibiotics to lower the risk for infection.   This injury usually takes about 4 weeks to heal. Finger injuries are often treated with a splint or cast, or by taping the injured finger to the next one (brant taping). This protects the injured finger and holds the bone in position while it heals. More serious fractures may need surgery.  If the fingernail has been severely injured, it will probably fall off in 1 to 2 weeks. A new fingernail will usually start to grow back within a month.  Home care  Follow these guidelines when caring for yourself at home:    Keep your hand elevated to reduce pain and swelling. When sitting or lying down keep your arm above the level of your heart. You can do this by placing your arm on a pillow that rests on your chest or on a pillow at your side. This is most important during the first 2 days (48 hours) after the injury.    Put an ice pack on the injured area. Do this for 20 minutes every 1 to 2 hours the first day for pain relief. You can make an ice pack by wrapping a plastic bag of ice cubes in a thin towel. As the ice melts, be careful that the cast or splint doesn t get wet. Continue using the ice pack 3 to 4 times a day until the pain and swelling go away.    Keep the cast or splint completely dry at all times. Bathe with your cast or splint out of the water. Protect it with a large plastic bag, rubber-banded at the top end. If a fiberglass cast or splint gets wet, you can dry it with a hair dryer.    You may use acetaminophen or ibuprofen to control pain, unless another pain medicine was prescribed. If you have chronic liver or kidney disease, talk with your healthcare provider before using these medicines. Also  talk with your provider if you ve had a stomach ulcer or gastrointestinal bleeding.    If brant tape was applied and it becomes wet or dirty, change it. You may replace it with paper, plastic, or cloth tape. Cloth tape and paper tapes must be kept dry. Keep the brant tape in place for at least 4 weeks.    Take all antibiotics until you have finished them.    Don t put creams or objects under the cast if you have itching.  Follow-up care  Follow up with your healthcare provider, or as advised. This is to make sure the bone is healing the way it should.  X-rays may be taken. You will be told of any new findings that may affect your care.  When to seek medical advice  Call your healthcare provider right away if any of these occur:    The cast or splint cracks    The plaster cast or splint becomes wet or soft    The fiberglass cast or splint stays wet for more than 24 hours    Pain or swelling gets worse    Tightness or pressure under the cast or splint gets worse    Finger becomes cold, blue, numb, or tingly    You can t move your finger    Redness, warmth, swelling, drainage from the wound, or foul odor from a cast or splint    Fever of 100.4 F (38 C) or higher, or as directed by your healthcare provider   Date Last Reviewed: 2/1/2017 2000-2018 [employer]. [address] [place] [address], [place] [address]. All rights reserved. This information is not intended as a substitute for professional medical care. Always follow your healthcare professional's instructions.

## 2021-01-19 ENCOUNTER — OFFICE VISIT (OUTPATIENT)
Dept: FAMILY MEDICINE | Facility: CLINIC | Age: 61
End: 2021-01-19
Payer: MEDICAID

## 2021-01-19 VITALS
BODY MASS INDEX: 31.89 KG/M2 | WEIGHT: 203.6 LBS | RESPIRATION RATE: 20 BRPM | SYSTOLIC BLOOD PRESSURE: 128 MMHG | TEMPERATURE: 98.6 F | DIASTOLIC BLOOD PRESSURE: 84 MMHG | HEART RATE: 72 BPM | OXYGEN SATURATION: 99 %

## 2021-01-19 DIAGNOSIS — N89.8 VAGINAL ITCHING: Primary | ICD-10-CM

## 2021-01-19 DIAGNOSIS — E06.3 HYPOTHYROIDISM DUE TO HASHIMOTO'S THYROIDITIS: ICD-10-CM

## 2021-01-19 DIAGNOSIS — R21 RASH OF HAND: ICD-10-CM

## 2021-01-19 DIAGNOSIS — Z86.2 HISTORY OF ANEMIA: ICD-10-CM

## 2021-01-19 DIAGNOSIS — R30.0 DYSURIA: ICD-10-CM

## 2021-01-19 LAB
ALBUMIN UR-MCNC: NEGATIVE MG/DL
APPEARANCE UR: CLEAR
BACTERIA #/AREA URNS HPF: ABNORMAL /HPF
BASOPHILS # BLD AUTO: 0 10E9/L (ref 0–0.2)
BASOPHILS NFR BLD AUTO: 0.5 %
BILIRUB UR QL STRIP: NEGATIVE
COLOR UR AUTO: YELLOW
DIFFERENTIAL METHOD BLD: ABNORMAL
EOSINOPHIL # BLD AUTO: 0.1 10E9/L (ref 0–0.7)
EOSINOPHIL NFR BLD AUTO: 1.8 %
ERYTHROCYTE [DISTWIDTH] IN BLOOD BY AUTOMATED COUNT: 12.6 % (ref 10–15)
GLUCOSE UR STRIP-MCNC: NEGATIVE MG/DL
HCT VFR BLD AUTO: 38.9 % (ref 35–47)
HGB BLD-MCNC: 12.8 G/DL (ref 11.7–15.7)
HGB UR QL STRIP: ABNORMAL
KETONES UR STRIP-MCNC: NEGATIVE MG/DL
LEUKOCYTE ESTERASE UR QL STRIP: ABNORMAL
LYMPHOCYTES # BLD AUTO: 1.6 10E9/L (ref 0.8–5.3)
LYMPHOCYTES NFR BLD AUTO: 41.4 %
MCH RBC QN AUTO: 32.3 PG (ref 26.5–33)
MCHC RBC AUTO-ENTMCNC: 32.9 G/DL (ref 31.5–36.5)
MCV RBC AUTO: 98 FL (ref 78–100)
MONOCYTES # BLD AUTO: 0.3 10E9/L (ref 0–1.3)
MONOCYTES NFR BLD AUTO: 8.7 %
NEUTROPHILS # BLD AUTO: 1.9 10E9/L (ref 1.6–8.3)
NEUTROPHILS NFR BLD AUTO: 47.6 %
NITRATE UR QL: NEGATIVE
NON-SQ EPI CELLS #/AREA URNS LPF: ABNORMAL /LPF
PH UR STRIP: 5 PH (ref 5–7)
PLATELET # BLD AUTO: 277 10E9/L (ref 150–450)
RBC # BLD AUTO: 3.96 10E12/L (ref 3.8–5.2)
RBC #/AREA URNS AUTO: ABNORMAL /HPF
SOURCE: ABNORMAL
SP GR UR STRIP: 1.02 (ref 1–1.03)
SPECIMEN SOURCE: NORMAL
TSH SERPL DL<=0.005 MIU/L-ACNC: 24.47 MU/L (ref 0.4–4)
UROBILINOGEN UR STRIP-ACNC: 0.2 EU/DL (ref 0.2–1)
WBC # BLD AUTO: 3.9 10E9/L (ref 4–11)
WBC #/AREA URNS AUTO: ABNORMAL /HPF
WET PREP SPEC: NORMAL

## 2021-01-19 PROCEDURE — 85025 COMPLETE CBC W/AUTO DIFF WBC: CPT | Performed by: PHYSICIAN ASSISTANT

## 2021-01-19 PROCEDURE — 87210 SMEAR WET MOUNT SALINE/INK: CPT | Performed by: PHYSICIAN ASSISTANT

## 2021-01-19 PROCEDURE — 99214 OFFICE O/P EST MOD 30 MIN: CPT | Performed by: PHYSICIAN ASSISTANT

## 2021-01-19 PROCEDURE — 84443 ASSAY THYROID STIM HORMONE: CPT | Performed by: PHYSICIAN ASSISTANT

## 2021-01-19 PROCEDURE — 81001 URINALYSIS AUTO W/SCOPE: CPT | Performed by: PHYSICIAN ASSISTANT

## 2021-01-19 PROCEDURE — 36415 COLL VENOUS BLD VENIPUNCTURE: CPT | Performed by: PHYSICIAN ASSISTANT

## 2021-01-19 RX ORDER — TRIAMCINOLONE ACETONIDE 1 MG/G
CREAM TOPICAL 2 TIMES DAILY
Qty: 80 G | Refills: 0 | Status: SHIPPED | OUTPATIENT
Start: 2021-01-19 | End: 2022-03-31

## 2021-01-19 RX ORDER — LEVOTHYROXINE SODIUM 50 UG/1
TABLET ORAL
Qty: 15 TABLET | Refills: 0 | Status: SHIPPED | OUTPATIENT
Start: 2021-01-19 | End: 2021-01-19

## 2021-01-19 RX ORDER — LEVOTHYROXINE SODIUM 50 UG/1
TABLET ORAL
Qty: 60 TABLET | Refills: 0 | Status: SHIPPED | OUTPATIENT
Start: 2021-01-19 | End: 2021-03-23

## 2021-01-19 NOTE — PROGRESS NOTES
Subjective     Gilberto is a 60 year old who presents to clinic today for the following health issues    HPI       Hypothyroidism Follow-up      Since last visit, patient describes the following symptoms: dry skin and constipation, Weight gain  Some mild weight gain. No constipation. No profound fatigue    How many servings of fruits and vegetables do you eat daily?  0-1    On average, how many sweetened beverages do you drink each day (Examples: soda, juice, sweet tea, etc.  Do NOT count diet or artificially sweetened beverages)?   1    How many days per week do you exercise enough to make your heart beat faster? 3 or less    How many minutes a day do you exercise enough to make your heart beat faster? 9 or less  How many days per week do you miss taking your medication? Has not been on thyroid medications    What makes it hard for you to take your medications?  stopped medication  Hasn't been taking her levothyroxine for some time now.   Vaginal Symptoms - gotten better - would like to do a self swab no irritative/obst voiding symptoms. No obvious vaginal discharge . No fevers.  Onset/Duration: 2 weels  Description:  Vaginal Discharge: none   Itching (Pruritis): YES  Burning sensation:  no  Odor: no  Accompanying Signs & Symptoms:  Urinary symptoms: no  Abdominal pain: YES  Fever: no  History:   Sexually active: YES  New Partner: no  Possibility of Pregnancy:  no  Recent antibiotic use: no  Previous vaginitis issues: YES  Precipitating or alleviating factors: None  Therapies tried and outcome: none      Urine symptoms and vaginal itching has improved.     Review of Systems   Constitutional, HEENT, cardiovascular, pulmonary, GI, , musculoskeletal, neuro, skin, endocrine and psych systems are negative, except as otherwise noted.      Objective    /84   Pulse 72   Temp 98.6  F (37  C) (Tympanic)   Resp 20   Wt 92.4 kg (203 lb 9.6 oz)   SpO2 99%   Breastfeeding No   BMI 31.89 kg/m    Body mass index is  31.89 kg/m .  Physical Exam       Eye exam - right eye normal lid, conjunctiva, cornea, pupil and fundus, left eye normal lid, conjunctiva, cornea, pupil and fundus.  Thyroid not palpable, not enlarged, no nodules detected.  CHEST:chest clear to IPPA, no tachypnea, retractions or cyanosis and S1, S2 normal, no murmur, no gallop, rate regular.  No edema  Patient defers a vaginal exam.   Gilberto was seen today for thyroid problem and vaginal problem.    Diagnoses and all orders for this visit:    Vaginal itching  -     Wet prep    Dysuria  -     *UA reflex to Microscopic and Culture ([facility] (except [place] and [place])    Hypothyroidism due to Hashimoto's thyroiditis  -     TSH    History of anemia  -     CBC with platelets and differential      Advised supportive and symptomatic treatment.  Follow up with Provider - if condition persists or worsens.   work on lifestyle modification

## 2021-01-19 NOTE — LETTER
[facility]    [address] [place] [address]  [phone]                                   January 20, 2021    Gilberto Bauman  [address] [address] [place] [id number]        Dear Gilberto,    Your tsh came back elevated. Restart your levothyroxine and recheck another tsh in 8 weeks.     Results for orders placed or performed in visit on 01/19/21   TSH     Status: Abnormal   Result Value Ref Range    TSH 24.47 (H) 0.40 - 4.00 mU/L   *UA reflex to Microscopic and Culture ([place] [facility] (except [place] and [place])     Status: Abnormal    Specimen: Midstream Urine   Result Value Ref Range    Color Urine Yellow     Appearance Urine Clear     Glucose Urine Negative NEG^Negative mg/dL    Bilirubin Urine Negative NEG^Negative    Ketones Urine Negative NEG^Negative mg/dL    Specific Gravity Urine 1.025 1.003 - 1.035    Blood Urine Trace (A) NEG^Negative    pH Urine 5.0 5.0 - 7.0 pH    Protein Albumin Urine Negative NEG^Negative mg/dL    Urobilinogen Urine 0.2 0.2 - 1.0 EU/dL    Nitrite Urine Negative NEG^Negative    Leukocyte Esterase Urine Trace (A) NEG^Negative    Source Midstream Urine    CBC with platelets and differential     Status: Abnormal   Result Value Ref Range    WBC 3.9 (L) 4.0 - 11.0 10e9/L    RBC Count 3.96 3.8 - 5.2 10e12/L    Hemoglobin 12.8 11.7 - 15.7 g/dL    Hematocrit 38.9 35.0 - 47.0 %    MCV 98 78 - 100 fl    MCH 32.3 26.5 - 33.0 pg    MCHC 32.9 31.5 - 36.5 g/dL    RDW 12.6 10.0 - 15.0 %    Platelet Count 277 150 - 450 10e9/L    % Neutrophils 47.6 %    % Lymphocytes 41.4 %    % Monocytes 8.7 %    % Eosinophils 1.8 %    % Basophils 0.5 %    Absolute Neutrophil 1.9 1.6 - 8.3 10e9/L    Absolute Lymphocytes 1.6 0.8 - 5.3 10e9/L    Absolute Monocytes 0.3 0.0 - 1.3 10e9/L    Absolute Eosinophils 0.1 0.0 - 0.7 10e9/L    Absolute Basophils 0.0 0.0 - 0.2 10e9/L    Diff Method Automated Method    Urine Microscopic     Status: Abnormal   Result Value Ref Range    WBC Urine 0 -  5 OTO5^0 - 5 /HPF    RBC Urine O - 2 OTO2^O - 2 /HPF    Squamous Epithelial /LPF Urine Moderate (A) FEW^Few /LPF    Bacteria Urine Moderate (A) NEG^Negative /HPF   Wet prep     Status: None    Specimen: Vagina   Result Value Ref Range    Specimen Description Vagina     Wet Prep No Trichomonas seen     Wet Prep No clue cells seen     Wet Prep No yeast seen     Wet Prep Few  WBC'S seen          If you have any questions please call the clinic at [phone]    Sincerely,    Scott Echeverria PA-C  bmd

## 2021-01-30 ENCOUNTER — HEALTH MAINTENANCE LETTER (OUTPATIENT)
Age: 61
End: 2021-01-30

## 2021-03-20 DIAGNOSIS — E06.3 HYPOTHYROIDISM DUE TO HASHIMOTO'S THYROIDITIS: ICD-10-CM

## 2021-03-23 NOTE — TELEPHONE ENCOUNTER
Routing refill request to provider for review/approval because:  Labs out of range:  TSH has not repeated labs as instructed    Lab Results   Component Value Date    TSH 24.47 01/19/2021        Scott Echeverria PA-C   1/20/2021  6:16 AM CST      Gilberto,  Your tsh came back elevated. Restart your levothyroxine and recheck another tsh in 8 weeks.     Scott

## 2021-03-24 RX ORDER — LEVOTHYROXINE SODIUM 50 UG/1
TABLET ORAL
Qty: 60 TABLET | Refills: 0 | Status: SHIPPED | OUTPATIENT
Start: 2021-03-24 | End: 2021-06-04

## 2021-03-26 DIAGNOSIS — E06.3 HYPOTHYROIDISM DUE TO HASHIMOTO'S THYROIDITIS: ICD-10-CM

## 2021-03-29 RX ORDER — LEVOTHYROXINE SODIUM 50 UG/1
TABLET ORAL
Qty: 60 TABLET | Refills: 0 | OUTPATIENT
Start: 2021-03-29

## 2021-04-01 ENCOUNTER — DOCUMENTATION ONLY (OUTPATIENT)
Dept: FAMILY MEDICINE | Facility: CLINIC | Age: 61
End: 2021-04-01

## 2021-04-01 ENCOUNTER — VIRTUAL VISIT (OUTPATIENT)
Dept: FAMILY MEDICINE | Facility: CLINIC | Age: 61
End: 2021-04-01
Payer: COMMERCIAL

## 2021-04-01 DIAGNOSIS — E55.9 VITAMIN D DEFICIENCY: Primary | ICD-10-CM

## 2021-04-01 DIAGNOSIS — D51.8 OTHER VITAMIN B12 DEFICIENCY ANEMIA: ICD-10-CM

## 2021-04-01 DIAGNOSIS — Z13.6 CARDIOVASCULAR SCREENING; LDL GOAL LESS THAN 160: ICD-10-CM

## 2021-04-01 DIAGNOSIS — E06.3 HYPOTHYROIDISM DUE TO HASHIMOTO'S THYROIDITIS: ICD-10-CM

## 2021-04-01 DIAGNOSIS — E06.3 HYPOTHYROIDISM DUE TO HASHIMOTO'S THYROIDITIS: Primary | ICD-10-CM

## 2021-04-01 PROCEDURE — 99213 OFFICE O/P EST LOW 20 MIN: CPT | Mod: 95 | Performed by: PHYSICIAN ASSISTANT

## 2021-04-01 NOTE — PROGRESS NOTES
Gilberto is a 60 year old who is being evaluated via a billable telephone visit.    What phone number would you like to be contacted at? [phone]  How would you like to obtain your AVS? Robert Macias is a 60 year old who presents for the following health issues    HPI     Requesting future lab orders  - has lab appointment on 4/5/21, for thyroid blood work  - would like to discuss having other blood tests drawn.    Some mild fatigue. No constipation or dry skin. No constipation.       Review of Systems   Constitutional, HEENT, cardiovascular, pulmonary, GI, , musculoskeletal, neuro, skin, endocrine and psych systems are negative, except as otherwise noted.      Objective           Vitals:  No vitals were obtained today due to virtual visit.    Physical Exam   healthy, alert and no distress  PSYCH: Alert and oriented times 3; coherent speech, normal   rate and volume, able to articulate logical thoughts, able   to abstract reason, no tangential thoughts, no hallucinations   or delusions  Her affect is normal  RESP: No cough, no audible wheezing, able to talk in full sentences  Remainder of exam unable to be completed due to telephone visits    Gilberto was seen today for consult.    Diagnoses and all orders for this visit:    Vitamin D deficiency  -     Vitamin D Deficiency; Future    Hypothyroidism due to Hashimoto's thyroiditis  -     **TSH with free T4 reflex FUTURE anytime; Future    Other vitamin B12 deficiency anemia  -     Vitamin B12; Future    will contact her with her tsh after her future lab results.   work on lifestyle modification          Phone call duration: 6 minutes

## 2021-04-01 NOTE — PROGRESS NOTES
Please review pended lab orders for upcoming lab appointment on 4/5/21. Patient also thyroid labs.    Thank you,  [facility]

## 2021-04-05 DIAGNOSIS — Z13.6 CARDIOVASCULAR SCREENING; LDL GOAL LESS THAN 160: ICD-10-CM

## 2021-04-05 DIAGNOSIS — D51.8 OTHER VITAMIN B12 DEFICIENCY ANEMIA: ICD-10-CM

## 2021-04-05 DIAGNOSIS — E06.3 HYPOTHYROIDISM DUE TO HASHIMOTO'S THYROIDITIS: ICD-10-CM

## 2021-04-05 DIAGNOSIS — E55.9 VITAMIN D DEFICIENCY: ICD-10-CM

## 2021-04-05 LAB
CHOLEST SERPL-MCNC: 210 MG/DL
DEPRECATED CALCIDIOL+CALCIFEROL SERPL-MC: 35 UG/L (ref 20–75)
HDLC SERPL-MCNC: 45 MG/DL
LDLC SERPL CALC-MCNC: 117 MG/DL
NONHDLC SERPL-MCNC: 165 MG/DL
T4 FREE SERPL-MCNC: 0.82 NG/DL (ref 0.76–1.46)
TRIGL SERPL-MCNC: 240 MG/DL
TSH SERPL DL<=0.005 MIU/L-ACNC: 10.8 MU/L (ref 0.4–4)
VIT B12 SERPL-MCNC: 204 PG/ML (ref 193–986)

## 2021-04-05 PROCEDURE — 80061 LIPID PANEL: CPT | Performed by: PHYSICIAN ASSISTANT

## 2021-04-05 PROCEDURE — 82306 VITAMIN D 25 HYDROXY: CPT | Performed by: PHYSICIAN ASSISTANT

## 2021-04-05 PROCEDURE — 84439 ASSAY OF FREE THYROXINE: CPT | Performed by: PHYSICIAN ASSISTANT

## 2021-04-05 PROCEDURE — 84443 ASSAY THYROID STIM HORMONE: CPT | Performed by: PHYSICIAN ASSISTANT

## 2021-04-05 PROCEDURE — 82607 VITAMIN B-12: CPT | Performed by: PHYSICIAN ASSISTANT

## 2021-04-05 PROCEDURE — 36415 COLL VENOUS BLD VENIPUNCTURE: CPT | Performed by: PHYSICIAN ASSISTANT

## 2021-06-02 ENCOUNTER — NURSE TRIAGE (OUTPATIENT)
Dept: NURSING | Facility: CLINIC | Age: 61
End: 2021-06-02

## 2021-06-02 DIAGNOSIS — E06.3 HYPOTHYROIDISM DUE TO HASHIMOTO'S THYROIDITIS: ICD-10-CM

## 2021-06-02 NOTE — TELEPHONE ENCOUNTER
RN triage   Call from pt   Pt is scheduled for first covid vaccine tomorrow and has lots of questions   Wants to know when someone should not get vaccine - side effects - etc   Reviewed with pt reasons to postpone -- reasons to check w/ PCP etc   Pt has not known exposures- no symptoms and plans to get vaccine tomorrow     Socorro Chan RN  [facility]  Triage Nurse Advisor  COVID 19 Nurse Triage Plan/Patient Instructions    Please be aware that novel coronavirus (COVID-19) may be circulating in the community. If you develop symptoms such as fever, cough, or SOB or if you have concerns about the presence of another infection including coronavirus (COVID-19), please contact your health care provider or visit [email or web address].     Disposition/Instructions    Home care recommended. Follow home care protocol based instructions.    Thank you for taking steps to prevent the spread of this virus.  o Limit your contact with others.  o Wear a simple mask to cover your cough.  o Wash your hands well and often.    Resources    [facility]: About COVID-19: [email or web address]    CDC: What to Do If You're Sick: www.cdc.gov/coronavirus/2019-ncov/about/steps-when-sick.html    CDC: Ending Home Isolation: www.cdc.gov/coronavirus/2019-ncov/hcp/disposition-in-home-patients.html     CDC: Caring for Someone: www.cdc.gov/coronavirus/2019-ncov/if-you-are-sick/care-for-someone.html     [facility]: Interim Guidance for Hospital Discharge to Home: www.health.[facility].mn.us/diseases/coronavirus/hcp/hospdischarge.pdf    [facility] clinical trials (COVID-19 research studies): [email or web address]-clinical-trials     Below are the COVID-19 hotlines at the [facility] of [facility] ([facility]). Interpreters are available.   o For health questions: Call 478-039-2625 or 1-758.568.3393 (7 a.m. to 7 p.m.)  o For questions about schools and childcare: Call 335-212-1610 or 1-436.378.8513 (7 a.m. to 7 p.m.)                      Reason for Disposition    COVID-19 vaccine, Frequently Asked Questions (FAQs)    Protocols used: CORONAVIRUS (COVID-19) VACCINE QUESTIONS AND [id number]-I-MX 3.25

## 2021-06-03 ENCOUNTER — IMMUNIZATION (OUTPATIENT)
Dept: NURSING | Facility: CLINIC | Age: 61
End: 2021-06-03
Payer: COMMERCIAL

## 2021-06-03 PROCEDURE — 91300 PR COVID VAC PFIZER DIL RECON 30 MCG/0.3 ML IM: CPT

## 2021-06-03 PROCEDURE — 0001A PR COVID VAC PFIZER DIL RECON 30 MCG/0.3 ML IM: CPT

## 2021-06-03 NOTE — TELEPHONE ENCOUNTER
Routing refill request to provider for review/approval because:  Labs out of range:    TSH   Date Value Ref Range Status   04/05/2021 10.80 (H) 0.40 - 4.00 mU/L Final   Natividad Judge RN  [facility]

## 2021-06-04 RX ORDER — LEVOTHYROXINE SODIUM 50 UG/1
TABLET ORAL
Qty: 60 TABLET | Refills: 0 | Status: SHIPPED | OUTPATIENT
Start: 2021-06-04 | End: 2021-06-14

## 2021-06-14 DIAGNOSIS — E06.3 HYPOTHYROIDISM DUE TO HASHIMOTO'S THYROIDITIS: ICD-10-CM

## 2021-06-14 RX ORDER — LEVOTHYROXINE SODIUM 75 UG/1
75 TABLET ORAL DAILY
Qty: 60 TABLET | Refills: 1 | Status: SHIPPED | OUTPATIENT
Start: 2021-06-14 | End: 2021-08-27

## 2021-06-24 ENCOUNTER — IMMUNIZATION (OUTPATIENT)
Dept: NURSING | Facility: CLINIC | Age: 61
End: 2021-06-24
Attending: INTERNAL MEDICINE
Payer: COMMERCIAL

## 2021-06-24 PROCEDURE — 91300 PR COVID VAC PFIZER DIL RECON 30 MCG/0.3 ML IM: CPT

## 2021-06-24 PROCEDURE — 0002A PR COVID VAC PFIZER DIL RECON 30 MCG/0.3 ML IM: CPT

## 2021-08-03 DIAGNOSIS — E06.3 HYPOTHYROIDISM DUE TO HASHIMOTO'S THYROIDITIS: ICD-10-CM

## 2021-08-06 RX ORDER — LEVOTHYROXINE SODIUM 50 UG/1
TABLET ORAL
Qty: 60 TABLET | Refills: 0 | OUTPATIENT
Start: 2021-08-06

## 2021-08-25 ENCOUNTER — LAB (OUTPATIENT)
Dept: LAB | Facility: CLINIC | Age: 61
End: 2021-08-25
Payer: COMMERCIAL

## 2021-08-25 ENCOUNTER — DOCUMENTATION ONLY (OUTPATIENT)
Dept: LAB | Facility: CLINIC | Age: 61
End: 2021-08-25

## 2021-08-25 DIAGNOSIS — E06.3 HYPOTHYROIDISM DUE TO HASHIMOTO'S THYROIDITIS: Primary | ICD-10-CM

## 2021-08-25 DIAGNOSIS — E06.3 HYPOTHYROIDISM DUE TO HASHIMOTO'S THYROIDITIS: ICD-10-CM

## 2021-08-25 LAB — HOLD SPECIMEN: NORMAL

## 2021-08-25 PROCEDURE — 84439 ASSAY OF FREE THYROXINE: CPT

## 2021-08-25 PROCEDURE — 36415 COLL VENOUS BLD VENIPUNCTURE: CPT

## 2021-08-25 PROCEDURE — 84443 ASSAY THYROID STIM HORMONE: CPT

## 2021-08-25 NOTE — PROGRESS NOTES
Patient is coming in this afternoon and does not have and lab orders. Requesting for thyroid test, please review and place future orders. Thanks.

## 2021-08-26 ENCOUNTER — TELEPHONE (OUTPATIENT)
Dept: FAMILY MEDICINE | Facility: CLINIC | Age: 61
End: 2021-08-26

## 2021-08-26 DIAGNOSIS — E06.3 HYPOTHYROIDISM DUE TO HASHIMOTO'S THYROIDITIS: ICD-10-CM

## 2021-08-26 LAB
T4 FREE SERPL-MCNC: 0.77 NG/DL (ref 0.76–1.46)
TSH SERPL DL<=0.005 MIU/L-ACNC: 5.48 MU/L (ref 0.4–4)

## 2021-08-26 NOTE — TELEPHONE ENCOUNTER
Patient had virtual visit with Scott Echeverria, PCP, 4/1/21.       TSH   Date Value Ref Range Status   08/25/2021 5.48 (H) 0.40 - 4.00 mU/L Final   04/05/2021 10.80 (H) 0.40 - 4.00 mU/L Final     Provider has not yet reviewed lab results from yesterday.    Routed to Scott Echeverria to address levothyroxine dosing based on lab results.    Viola Khan RN  [facility]

## 2021-08-26 NOTE — TELEPHONE ENCOUNTER
Reason for Call:  Other     Detailed comments: Patient would like to be re prescribed the 50 mg due to she is seeing progress in her thyroid level.    Phone Number Patient can be reached at: Home number on file [phone] (home)    Best Time:     Can we leave a detailed message on this number? YES    Call taken on 8/26/2021 at 11:41 AM by Jenni Aburto

## 2021-08-27 DIAGNOSIS — E06.3 HYPOTHYROIDISM DUE TO HASHIMOTO'S THYROIDITIS: ICD-10-CM

## 2021-08-27 RX ORDER — LEVOTHYROXINE SODIUM 50 UG/1
50 TABLET ORAL DAILY
Qty: 60 TABLET | Refills: 0 | Status: SHIPPED | OUTPATIENT
Start: 2021-08-27 | End: 2021-10-25

## 2021-08-27 RX ORDER — LEVOTHYROXINE SODIUM 88 UG/1
88 TABLET ORAL DAILY
Qty: 60 TABLET | Refills: 0 | Status: SHIPPED | OUTPATIENT
Start: 2021-08-27 | End: 2021-08-27

## 2021-08-27 NOTE — TELEPHONE ENCOUNTER
"Advise her to take 50 mcg daily for the next 60 days. No \"on her own \" dose adjustments . I then want a tsh rechecked. If within therapeutic range, will continue her current dose, if not, will adjust accordingly.      "

## 2021-08-27 NOTE — TELEPHONE ENCOUNTER
I see previous levothyroxine was 75 mcg daily, Scott Echeverria sent 88 mcg daily now.    I called and spoke to patient, she says she never started the 75 mcg levothyroxine.  She had a supply of 50 mcg she was using up after the April lab.    Says she did not take any levothyroxine the whole month of May during Ramadan.   She took 50 mcg daily after Ramadan.  Some days only taking 1/2 tablet, unclear to me why she was doing that.    Since her lab was improved and she was only taking the 50 mcg for a short time, she feels she should check her TSH again after a longer time on the 50 mcg, does not want to increase to 75 mcg yet.    Bottom line, she wants to stay on 50 mcg, needs another Rx sent; then wants to check TSH again in a couple months.    Routed to Scott Echeverria to advise.   The turn of events and what dose she was on for how long was a bit confusing.    Viola Khan RN  [facility]

## 2021-08-27 NOTE — TELEPHONE ENCOUNTER
Actually, she needs a little higher dose. As such , i've sent a new rx for the 88 mcg dose to her pharmacy. Have her take one tab daily and recheck another tsh in 2 mos .

## 2021-08-27 NOTE — TELEPHONE ENCOUNTER
Notified patient of the orders below.  Scheduled lab for 2 months out.    Patient stated understanding and agreeable with the plan of care.     Suzy RN BSN  Triage Nurse  [facility]

## 2021-09-05 ENCOUNTER — HEALTH MAINTENANCE LETTER (OUTPATIENT)
Age: 61
End: 2021-09-05

## 2021-10-23 DIAGNOSIS — E06.3 HYPOTHYROIDISM DUE TO HASHIMOTO'S THYROIDITIS: ICD-10-CM

## 2021-10-25 RX ORDER — LEVOTHYROXINE SODIUM 50 UG/1
TABLET ORAL
Qty: 30 TABLET | Refills: 0 | Status: SHIPPED | OUTPATIENT
Start: 2021-10-25 | End: 2021-11-29

## 2021-10-25 NOTE — TELEPHONE ENCOUNTER
"Routing refill request to provider for review/approval because:  Labs out of range:  TSH  Pt has lab recheck 11/3/21      Medication pended for approval, 30 day supply with reminder.   Requested Prescriptions   Pending Prescriptions Disp Refills     levothyroxine (SYNTHROID/LEVOTHROID) 50 MCG tablet [Pharmacy Med Name: LEVOTHYROXINE 0.05MG (50MCG) TAB] 30 tablet 0     Sig: TAKE 1 TABLET(50 MCG) BY MOUTH DAILY       Thyroid Protocol Failed - 10/23/2021  3:19 AM        Failed - Normal TSH on file in past 12 months     Recent Labs   Lab Test 08/25/21  1455   TSH 5.48*              Passed - Patient is 12 years or older        Passed - Recent (12 mo) or future (30 days) visit within the authorizing provider's specialty     Patient has had an office visit with the authorizing provider or a provider within the authorizing providers department within the previous 12 mos or has a future within next 30 days. See \"Patient Info\" tab in inbasket, or \"Choose Columns\" in Meds & Orders section of the refill encounter.              Passed - Medication is active on med list        Passed - No active pregnancy on record     If patient is pregnant or has had a positive pregnancy test, please check TSH.          Passed - No positive pregnancy test in past 12 months     If patient is pregnant or has had a positive pregnancy test, please check TSH.               "

## 2021-11-03 ENCOUNTER — LAB (OUTPATIENT)
Dept: LAB | Facility: CLINIC | Age: 61
End: 2021-11-03
Payer: COMMERCIAL

## 2021-11-03 ENCOUNTER — DOCUMENTATION ONLY (OUTPATIENT)
Dept: LAB | Facility: CLINIC | Age: 61
End: 2021-11-03

## 2021-11-03 DIAGNOSIS — E06.3 HYPOTHYROIDISM DUE TO HASHIMOTO'S THYROIDITIS: ICD-10-CM

## 2021-11-03 DIAGNOSIS — E55.9 VITAMIN D DEFICIENCY: ICD-10-CM

## 2021-11-03 DIAGNOSIS — E55.9 VITAMIN D DEFICIENCY: Primary | ICD-10-CM

## 2021-11-03 LAB
HOLD SPECIMEN: NORMAL
TSH SERPL DL<=0.005 MIU/L-ACNC: 7.54 MU/L (ref 0.4–4)

## 2021-11-03 PROCEDURE — 84443 ASSAY THYROID STIM HORMONE: CPT

## 2021-11-03 PROCEDURE — 82306 VITAMIN D 25 HYDROXY: CPT

## 2021-11-03 PROCEDURE — 36415 COLL VENOUS BLD VENIPUNCTURE: CPT

## 2021-11-03 NOTE — PROGRESS NOTES
Patient came in for lab appointment only, requesting for vitamin D to be added on to blood drawn today. Please review and place order as pending add-on. Thanks.

## 2021-11-05 LAB — DEPRECATED CALCIDIOL+CALCIFEROL SERPL-MC: 30 UG/L (ref 20–75)

## 2021-11-29 DIAGNOSIS — E06.3 HYPOTHYROIDISM DUE TO HASHIMOTO'S THYROIDITIS: ICD-10-CM

## 2022-01-26 DIAGNOSIS — E06.3 HYPOTHYROIDISM DUE TO HASHIMOTO'S THYROIDITIS: ICD-10-CM

## 2022-01-28 NOTE — TELEPHONE ENCOUNTER
Routing refill request to provider for review/approval because:  Labs out of range:    TSH   Date Value Ref Range Status   11/03/2021 7.54 (H) 0.40 - 4.00 mU/L Final   04/05/2021 10.80 (H) 0.40 - 4.00 mU/L Final

## 2022-02-05 RX ORDER — LEVOTHYROXINE SODIUM 75 UG/1
TABLET ORAL
Qty: 60 TABLET | Refills: 0 | Status: SHIPPED | OUTPATIENT
Start: 2022-02-05 | End: 2022-04-05

## 2022-02-20 ENCOUNTER — HEALTH MAINTENANCE LETTER (OUTPATIENT)
Age: 62
End: 2022-02-20

## 2022-03-30 ENCOUNTER — LAB (OUTPATIENT)
Dept: LAB | Facility: CLINIC | Age: 62
End: 2022-03-30
Payer: COMMERCIAL

## 2022-03-30 ENCOUNTER — DOCUMENTATION ONLY (OUTPATIENT)
Dept: LAB | Facility: CLINIC | Age: 62
End: 2022-03-30

## 2022-03-30 DIAGNOSIS — E06.3 HYPOTHYROIDISM DUE TO HASHIMOTO'S THYROIDITIS: ICD-10-CM

## 2022-03-30 DIAGNOSIS — E55.9 VITAMIN D DEFICIENCY: ICD-10-CM

## 2022-03-30 DIAGNOSIS — E06.3 HYPOTHYROIDISM DUE TO HASHIMOTO'S THYROIDITIS: Primary | ICD-10-CM

## 2022-03-30 LAB
HOLD SPECIMEN: NORMAL

## 2022-03-30 PROCEDURE — 84443 ASSAY THYROID STIM HORMONE: CPT

## 2022-03-30 PROCEDURE — 84439 ASSAY OF FREE THYROXINE: CPT

## 2022-03-30 PROCEDURE — 82306 VITAMIN D 25 HYDROXY: CPT

## 2022-03-30 PROCEDURE — 36415 COLL VENOUS BLD VENIPUNCTURE: CPT

## 2022-03-30 NOTE — PROGRESS NOTES
Patient was in today for her lab only visit but has no orders. Thyroid and Vitamin D per patient. Please place orders if needed.    Thank you,  Tom/ cyndee

## 2022-03-31 ENCOUNTER — OFFICE VISIT (OUTPATIENT)
Dept: FAMILY MEDICINE | Facility: CLINIC | Age: 62
End: 2022-03-31
Payer: COMMERCIAL

## 2022-03-31 VITALS
SYSTOLIC BLOOD PRESSURE: 100 MMHG | WEIGHT: 207 LBS | TEMPERATURE: 99.4 F | BODY MASS INDEX: 34.49 KG/M2 | DIASTOLIC BLOOD PRESSURE: 60 MMHG | HEIGHT: 65 IN

## 2022-03-31 DIAGNOSIS — N89.8 VAGINAL ITCHING: Primary | ICD-10-CM

## 2022-03-31 DIAGNOSIS — K59.00 CONSTIPATION, UNSPECIFIED CONSTIPATION TYPE: ICD-10-CM

## 2022-03-31 LAB
ALBUMIN UR-MCNC: NEGATIVE MG/DL
APPEARANCE UR: CLEAR
BACTERIA #/AREA URNS HPF: ABNORMAL /HPF
BILIRUB UR QL STRIP: NEGATIVE
CLUE CELLS: NORMAL
COLOR UR AUTO: YELLOW
GLUCOSE UR STRIP-MCNC: NEGATIVE MG/DL
HGB UR QL STRIP: NEGATIVE
KETONES UR STRIP-MCNC: NEGATIVE MG/DL
LEUKOCYTE ESTERASE UR QL STRIP: ABNORMAL
MUCOUS THREADS #/AREA URNS LPF: PRESENT /LPF
NITRATE UR QL: NEGATIVE
PH UR STRIP: 5 [PH] (ref 5–7)
RBC #/AREA URNS AUTO: ABNORMAL /HPF
SP GR UR STRIP: >=1.03 (ref 1–1.03)
SQUAMOUS #/AREA URNS AUTO: ABNORMAL /LPF
TRICHOMONAS, WET PREP: NORMAL
UROBILINOGEN UR STRIP-ACNC: 0.2 E.U./DL
WBC #/AREA URNS AUTO: ABNORMAL /HPF
WBC'S/HIGH POWER FIELD, WET PREP: NORMAL
YEAST, WET PREP: NORMAL

## 2022-03-31 PROCEDURE — 81001 URINALYSIS AUTO W/SCOPE: CPT | Performed by: NURSE PRACTITIONER

## 2022-03-31 PROCEDURE — 87210 SMEAR WET MOUNT SALINE/INK: CPT | Performed by: NURSE PRACTITIONER

## 2022-03-31 PROCEDURE — 99213 OFFICE O/P EST LOW 20 MIN: CPT | Performed by: NURSE PRACTITIONER

## 2022-03-31 ASSESSMENT — ENCOUNTER SYMPTOMS
LIGHT-HEADEDNESS: 0
NAUSEA: 0
WHEEZING: 0
SORE THROAT: 0
RHINORRHEA: 0
DIAPHORESIS: 0
SHORTNESS OF BREATH: 0
CHILLS: 0
DIARRHEA: 0
EYE ITCHING: 0
FATIGUE: 0
DIZZINESS: 0
CONSTIPATION: 1
FEVER: 0
FREQUENCY: 0
SINUS PRESSURE: 0
HEADACHES: 0
EYE DISCHARGE: 0
COUGH: 0

## 2022-03-31 ASSESSMENT — PAIN SCALES - GENERAL: PAINLEVEL: NO PAIN (0)

## 2022-03-31 NOTE — PROGRESS NOTES
"  Assessment & Plan     Vaginal itching  Testing unremarkable today in clinic.  Will refer to gynecology for further management.  May try over-the-counter Replens.  - Wet preparation  - UA reflex to Microscopic and Culture; Future  - UA reflex to Microscopic and Culture  - Urine Microscopic  - Ob/Gyn Referral; Future    Constipation, unspecified constipation type  Enc to drink plenty of water  Allow adequate time for BM's  Eat high fiber foods  Promote daily BM   Over-the-counter MiraLAX every other day.    Review of the result(s) of each unique test - labs  Ordering of each unique test  25 minutes spent on the date of the encounter doing chart review, history and exam, documentation and further activities per the note     BMI:   Estimated body mass index is 34.98 kg/m  as calculated from the following:    Height as of this encounter: 1.638 m (5' 4.5\").    Weight as of this encounter: 93.9 kg (207 lb).     No follow-ups on file.    ASHLYN Jay CNP  [facility] Macias is a 61 year old who presents for the following health issues     History of Present Illness       Reason for visit:  Vaginal itching  Symptoms include:  Burning and itching  Symptom intensity:  Mild  Symptom progression:  Improving  Had these symptoms before:  No  What makes it worse:  Not  What makes it better:  Not    She eats 2-3 servings of fruits and vegetables daily.She consumes 2 sweetened beverage(s) daily.She exercises with enough effort to increase her heart rate 10 to 19 minutes per day.  She exercises with enough effort to increase her heart rate 3 or less days per week.   She is taking medications regularly.           Is having vaginal itching for the last few days. No colored discharge. Will have some wetness in underware. Occasionally will have lower abdomen pain, comes and goes. No back pain. No vaginal bleeding. Bowels are not moving well.  This is chronic.  Drinks at least 4 bottles of water " "per day.  Tries to eat plenty of fruits and vegetables.  Is up and active daily.  No concerns for sexually transmitted infections.    Review of Systems   Constitutional: Negative for chills, diaphoresis, fatigue and fever.   HENT: Negative for ear discharge, ear pain, hearing loss, rhinorrhea, sinus pressure and sore throat.    Eyes: Negative for discharge and itching.   Respiratory: Negative for cough, shortness of breath and wheezing.    Gastrointestinal: Positive for constipation. Negative for diarrhea and nausea.   Genitourinary: Negative for enuresis, frequency, pelvic pain, urgency and vaginal discharge.        Vaginal itching   Skin: Negative for rash.   Neurological: Negative for dizziness, light-headedness and headaches.          Objective    /60 (BP Location: Left arm, Patient Position: Sitting, Cuff Size: Adult Large)   Temp 99.4  F (37.4  C) (Tympanic)   Ht 1.638 m (5' 4.5\")   Wt 93.9 kg (207 lb)   LMP 01/05/2016   BMI 34.98 kg/m    Body mass index is 34.98 kg/m .  Physical Exam  Constitutional:       Appearance: She is well-developed.   Cardiovascular:      Rate and Rhythm: Normal rate and regular rhythm.   Pulmonary:      Effort: Pulmonary effort is normal.      Breath sounds: Normal breath sounds.   Genitourinary:     Pubic Area: No rash.       Labia:         Right: No rash, tenderness or lesion.         Left: No rash, tenderness or lesion.       Urethra: No urethral swelling or urethral lesion.      Comments: Dryness noted to vaginal area.  Skin:     General: Skin is warm.   Neurological:      Mental Status: She is alert.              "

## 2022-04-01 LAB
T4 FREE SERPL-MCNC: 0.97 NG/DL (ref 0.76–1.46)
TSH SERPL DL<=0.005 MIU/L-ACNC: 5.27 MU/L (ref 0.4–4)

## 2022-04-05 DIAGNOSIS — E06.3 HYPOTHYROIDISM DUE TO HASHIMOTO'S THYROIDITIS: ICD-10-CM

## 2022-04-05 LAB — DEPRECATED CALCIDIOL+CALCIFEROL SERPL-MC: 26 UG/L (ref 20–75)

## 2022-04-05 RX ORDER — LEVOTHYROXINE SODIUM 88 UG/1
88 TABLET ORAL DAILY
Qty: 90 TABLET | Refills: 0 | Status: SHIPPED | OUTPATIENT
Start: 2022-04-05 | End: 2022-06-08

## 2022-04-05 NOTE — PROGRESS NOTES
Patient notified and voiced understanding and agreement.  Natividad Judge RN  [facility]

## 2022-04-05 NOTE — TELEPHONE ENCOUNTER
Let los know her tsh came back a little elevated, as such, i'm increasing her levothyroxine to 88 mcg daily. I want her to have another tsh checked in 2-3 mos.

## 2022-04-06 DIAGNOSIS — E06.3 HYPOTHYROIDISM DUE TO HASHIMOTO'S THYROIDITIS: ICD-10-CM

## 2022-04-08 NOTE — TELEPHONE ENCOUNTER
Routing refill request to provider for review/approval because:  Drug not active on patient's medication list  TSH   Date Value Ref Range Status   03/30/2022 5.27 (H) 0.40 - 4.00 mU/L Final   04/05/2021 10.80 (H) 0.40 - 4.00 mU/L Final

## 2022-04-09 RX ORDER — LEVOTHYROXINE SODIUM 75 UG/1
TABLET ORAL
Qty: 60 TABLET | Refills: 0 | OUTPATIENT
Start: 2022-04-09

## 2022-06-01 ENCOUNTER — TELEPHONE (OUTPATIENT)
Dept: FAMILY MEDICINE | Facility: CLINIC | Age: 62
End: 2022-06-01
Payer: COMMERCIAL

## 2022-06-01 NOTE — TELEPHONE ENCOUNTER
Reason for Call:  Other     Detailed comments: Patient would like lab orders placed as discussed at last office visit.    Phone Number Patient can be reached at: Home number on file [phone] (home)    Best Time:     Can we leave a detailed message on this number? YES    Call taken on 6/1/2022 at 2:55 PM by Jenni Aburto

## 2022-06-01 NOTE — TELEPHONE ENCOUNTER
I saw patient for acute issue-vaginal discharge and constipation.  Perhaps it was Scott who recommended retesting her TSH??    Kristie Wright NP-C

## 2022-06-01 NOTE — TELEPHONE ENCOUNTER
Low Dose CT (LDCT) Lung Screening criteria met   Age 50-69   Pack year smoking >30   Still smoking or less than 15 year since quit   No sign or symptoms of lung cancer   > 11 months since last LDCT     Risks and benefits of lung cancer screening with LDCT scans discussed:    Significance of positive screen - False-positive LDCT results often occur. 95% of all positive results do not lead to a diagnosis of cancer. Usually further imaging can resolve most false-positive results; however, some patients may require invasive procedures. Over diagnosis risk - 10% to 12% of screen-detected lung cancer cases are over diagnosed--that is, the cancer would not have been detected in the patient's lifetime without the screening. SUBJECTIVE:  Jason Tripp   1961   male   No Known Allergies    Chief Complaint   Patient presents with    6 Month Follow-Up    Hypertension        Patient Active Problem List    Diagnosis Date Noted    RENO (obstructive sleep apnea) 02/15/2020    Hyperlipidemia 02/15/2020    Essential hypertension 02/15/2020       HPI   Pt is here today for fu on HTN, hyperlipidemia, RENO. He has been doing well on current tx mgt for HTN. Denies CP,SOB or myalgias. No ha,change in vision or neurologic sx. No GI complaints. Has a hx of elevated chol. Last . He has been using a CPAP. No longer smoking.    Past Medical History:   Diagnosis Date    ADHD (attention deficit hyperactivity disorder)     Chronic back pain     Hypertension     Sleep apnea      Social History     Socioeconomic History    Marital status:      Spouse name: Not on file    Number of children: Not on file    Years of education: Not on file    Highest education level: Not on file   Occupational History    Not on file   Social Needs    Financial resource strain: Not on file    Food insecurity     Worry: Not on file     Inability: Not on file    Transportation needs     Medical: Not on file     Non-medical: Not Please review and advise.     Routing to Kristie GOLDBERG CNP (last office visit with patient on 3/31/22).     Brittany Garcia RN BSN  [facility]     on file   Tobacco Use    Smoking status: Former Smoker     Packs/day: 1.00     Years: 35.00     Pack years: 35.00     Types: Cigars     Start date: 1981     Last attempt to quit: 2016     Years since quittin.9    Smokeless tobacco: Never Used   Substance and Sexual Activity    Alcohol use: No    Drug use: Not on file    Sexual activity: Not on file   Lifestyle    Physical activity     Days per week: Not on file     Minutes per session: Not on file    Stress: Not on file   Relationships    Social connections     Talks on phone: Not on file     Gets together: Not on file     Attends Islam service: Not on file     Active member of club or organization: Not on file     Attends meetings of clubs or organizations: Not on file     Relationship status: Not on file    Intimate partner violence     Fear of current or ex partner: Not on file     Emotionally abused: Not on file     Physically abused: Not on file     Forced sexual activity: Not on file   Other Topics Concern    Not on file   Social History Narrative    Not on file     No family history on file. Review of Systems   Constitutional: Negative for activity change, appetite change and unexpected weight change. Respiratory: Negative for cough, chest tightness and shortness of breath. Cardiovascular: Negative for chest pain, palpitations and leg swelling. Gastrointestinal: Negative for abdominal pain, blood in stool, constipation and diarrhea. Musculoskeletal: Negative for arthralgias and myalgias. Skin: Negative for rash. Neurological: Negative for light-headedness and headaches. Hematological: Negative for adenopathy. Does not bruise/bleed easily. Psychiatric/Behavioral: Negative for dysphoric mood and sleep disturbance. The patient is not nervous/anxious.         OBJECTIVE:  /80   Pulse 65   Temp 97.8 °F (36.6 °C)   Ht 5' 6\" (1.676 m)   Wt 181 lb (82.1 kg)   SpO2 98%   BMI 29.21 kg/m²   Physical Exam  Vitals signs and nursing note reviewed. Constitutional:       Appearance: He is well-developed. Eyes:      Pupils: Pupils are equal, round, and reactive to light. Neck:      Musculoskeletal: Normal range of motion and neck supple. Thyroid: No thyromegaly. Vascular: No JVD. Cardiovascular:      Rate and Rhythm: Normal rate and regular rhythm. Pulmonary:      Effort: Pulmonary effort is normal.      Breath sounds: Normal breath sounds. Abdominal:      General: Bowel sounds are normal.      Palpations: Abdomen is soft. Tenderness: There is no abdominal tenderness. Skin:     Findings: No rash. Neurological:      Mental Status: He is alert and oriented to person, place, and time. Psychiatric:         Behavior: Behavior normal.         Thought Content: Thought content normal.         ASSESSMENT/PLAN:    1. Essential hypertension  Refill med  Intermittent ambulatory BP checks  DASH diet reviewed  - Comprehensive Metabolic Panel; Future  - CBC Auto Differential; Future  - Comprehensive Metabolic Panel; Future  - CBC Auto Differential; Future  - CBC Auto Differential  - Comprehensive Metabolic Panel    2. Hyperlipidemia, unspecified hyperlipidemia type  Reviewed last labs  appr low chol diet    3. Former smoker    - [address] (Initial or Annual); Future    4. Personal history of tobacco use    - [place] VISIT TO DISCUSS LUNG CA SCREEN W LDCT  - CT Lung Screen (Annual); Future    5. RENO (obstructive sleep apnea)  Weight mgt  CPAP      Orders Placed This Encounter   Procedures    CT Lung Screen (Initial or Annual)     Age: 64 y.o.    Smoking History:   Social History    Tobacco Use      Smoking status: Former Smoker        Packs/day: 1.00        Years: 35.00        Pack years: 35        Types: Cigars        Start date: 1981        Quit date: 2016        Years since quittin.9      Smokeless tobacco: Never Used    Alcohol use: No    Drug use: Not on file    Pack years: 28  [unfilled] Standing Status:   Future     Standing Expiration Date:   2022     Order Specific Question:   Is there documentation of shared decision making? Answer:   Yes     Order Specific Question:   Does the patient show any signs or symptoms of lung cancer? Answer:   No     Order Specific Question:   Is this the first (baseline) CT or an annual exam?     Answer:   Baseline [1]     Order Specific Question:   Is this a low dose CT or a routine CT? Answer:   Low Dose CT [1]     Order Specific Question:   Smoking Status? Answer: Former Smoker [4]     Order Specific Question:   Date quit smoking? (must be within 15 years)     Answer:   2016     Order Specific Question:   Smoking packs per day? Answer:   1     Order Specific Question:   Years smoking? Answer:   28    CT Lung Screen (Annual)     Age: Patient is 64 y.o. Smoking History: Social History    Tobacco Use      Smoking status: Former Smoker        Packs/day: 1.00        Years: 35.00        Pack years: 28        Types: Cigars        Start date: 1981        Quit date: 2016        Years since quittin.9      Smokeless tobacco: Never Used    Alcohol use: No    Drug use: Not on file   Pack years: 35    Date of last lung cancer screening: [unfilled]     Standing Status:   Future     Standing Expiration Date:   2022     Order Specific Question:   Is there documentation of shared decision making? Answer:   Yes     Order Specific Question:   Is this a low dose CT or a routine CT? Answer:   Low Dose CT [1]     Order Specific Question:   Is this the first (baseline) CT or an annual exam?     Answer:   Baseline [1]     Order Specific Question:   Does the patient show any signs or symptoms of lung cancer? Answer:   No     Order Specific Question:   Smoking Status? Answer:    Former Smoker [4]     Order Specific Question:   Date quit smoking? (must be within 15 years)     Answer:   2016     Order Specific Question: Smoking packs per day? Answer:   1     Order Specific Question:   Years smoking? Answer:   28    Comprehensive Metabolic Panel     Standing Status:   Future     Standing Expiration Date:   12/24/2021    CBC Auto Differential     Standing Status:   Future     Standing Expiration Date:   12/24/2021    Comprehensive Metabolic Panel     Standing Status:   Future     Number of Occurrences:   1     Standing Expiration Date:   12/24/2021    CBC Auto Differential     Standing Status:   Future     Number of Occurrences:   1     Standing Expiration Date:   12/24/2021    [place] VISIT TO DISCUSS LUNG CA SCREEN W LDCT     Current Outpatient Medications   Medication Sig Dispense Refill    losartan (COZAAR) 50 MG tablet Take 1 tablet by mouth daily 90 tablet 1     No current facility-administered medications for this visit. Return in about 6 months (around 6/4/2021), or if symptoms worsen or fail to improve, for HTN. Sophie Phillip MD    Need for follow up screens annually to continue lung cancer screening effectiveness     Risks associated with radiation from annual LDCT- Radiation exposure is about the same as for a mammogram, which is about 1/3 of the annual background radiation exposure from everyday life. Starting screening at age 54 is not likely to increase cancer risk from radiation exposure. Patients with comorbidities resulting in life expectancy of < 10 years, or that would preclude treatment of an abnormality identified on CT, should not be screened due to lack of benefit.     To obtain maximal benefit from this screening, smoking cessation and long-term abstinence from smoking is critical

## 2022-06-02 NOTE — TELEPHONE ENCOUNTER
I called patient to find out which labs she wanted. She is anxious to have her TSH level rechecked. Patient is also overdue for her Annual Physical & fasting blood work.      Patient requested to see Kristie Wright for her exam.       Future Office Visit: (Will Be fasting)  Next 5 appointments (look out 90 days)    Jun 06, 2022 11:00 AM  (Arrive by 10:40 AM)  Annual Wellness Visit with ASHLYN Knight CNP  [facility] [place] ([facility] ) [address]  [place] [address]  [phone]         Brittany Garcia RN BSN  [facility]

## 2022-06-06 ENCOUNTER — OFFICE VISIT (OUTPATIENT)
Dept: FAMILY MEDICINE | Facility: CLINIC | Age: 62
End: 2022-06-06
Payer: COMMERCIAL

## 2022-06-06 VITALS
HEART RATE: 72 BPM | TEMPERATURE: 98.3 F | SYSTOLIC BLOOD PRESSURE: 112 MMHG | HEIGHT: 65 IN | DIASTOLIC BLOOD PRESSURE: 70 MMHG | RESPIRATION RATE: 16 BRPM | WEIGHT: 198.2 LBS | BODY MASS INDEX: 33.02 KG/M2

## 2022-06-06 DIAGNOSIS — R53.83 FATIGUE, UNSPECIFIED TYPE: ICD-10-CM

## 2022-06-06 DIAGNOSIS — Z12.11 SCREEN FOR COLON CANCER: ICD-10-CM

## 2022-06-06 DIAGNOSIS — Z23 NEED FOR VACCINATION: ICD-10-CM

## 2022-06-06 DIAGNOSIS — Z00.00 ROUTINE GENERAL MEDICAL EXAMINATION AT A HEALTH CARE FACILITY: ICD-10-CM

## 2022-06-06 DIAGNOSIS — Z12.4 CERVICAL CANCER SCREENING: Primary | ICD-10-CM

## 2022-06-06 DIAGNOSIS — Z12.31 ENCOUNTER FOR SCREENING MAMMOGRAM FOR BREAST CANCER: ICD-10-CM

## 2022-06-06 LAB
ERYTHROCYTE [DISTWIDTH] IN BLOOD BY AUTOMATED COUNT: 12.2 % (ref 10–15)
HCT VFR BLD AUTO: 36.8 % (ref 35–47)
HGB BLD-MCNC: 12.3 G/DL (ref 11.7–15.7)
MCH RBC QN AUTO: 31.7 PG (ref 26.5–33)
MCHC RBC AUTO-ENTMCNC: 33.4 G/DL (ref 31.5–36.5)
MCV RBC AUTO: 95 FL (ref 78–100)
PLATELET # BLD AUTO: 268 10E3/UL (ref 150–450)
RBC # BLD AUTO: 3.88 10E6/UL (ref 3.8–5.2)
WBC # BLD AUTO: 3.1 10E3/UL (ref 4–11)

## 2022-06-06 PROCEDURE — 82306 VITAMIN D 25 HYDROXY: CPT | Performed by: NURSE PRACTITIONER

## 2022-06-06 PROCEDURE — G0145 SCR C/V CYTO,THINLAYER,RESCR: HCPCS | Performed by: NURSE PRACTITIONER

## 2022-06-06 PROCEDURE — 80061 LIPID PANEL: CPT | Performed by: NURSE PRACTITIONER

## 2022-06-06 PROCEDURE — 36415 COLL VENOUS BLD VENIPUNCTURE: CPT | Performed by: NURSE PRACTITIONER

## 2022-06-06 PROCEDURE — 90471 IMMUNIZATION ADMIN: CPT | Performed by: NURSE PRACTITIONER

## 2022-06-06 PROCEDURE — 99213 OFFICE O/P EST LOW 20 MIN: CPT | Mod: 25 | Performed by: NURSE PRACTITIONER

## 2022-06-06 PROCEDURE — 85027 COMPLETE CBC AUTOMATED: CPT | Performed by: NURSE PRACTITIONER

## 2022-06-06 PROCEDURE — 84439 ASSAY OF FREE THYROXINE: CPT | Performed by: NURSE PRACTITIONER

## 2022-06-06 PROCEDURE — 99396 PREV VISIT EST AGE 40-64: CPT | Mod: 25 | Performed by: NURSE PRACTITIONER

## 2022-06-06 PROCEDURE — 90632 HEPA VACCINE ADULT IM: CPT | Performed by: NURSE PRACTITIONER

## 2022-06-06 PROCEDURE — 84443 ASSAY THYROID STIM HORMONE: CPT | Performed by: NURSE PRACTITIONER

## 2022-06-06 PROCEDURE — 87624 HPV HI-RISK TYP POOLED RSLT: CPT | Performed by: NURSE PRACTITIONER

## 2022-06-06 PROCEDURE — 80048 BASIC METABOLIC PNL TOTAL CA: CPT | Performed by: NURSE PRACTITIONER

## 2022-06-06 PROCEDURE — 83550 IRON BINDING TEST: CPT | Performed by: NURSE PRACTITIONER

## 2022-06-06 ASSESSMENT — ENCOUNTER SYMPTOMS
VOMITING: 0
ARTHRALGIAS: 0
HEMATOCHEZIA: 0
HEMATURIA: 0
DYSURIA: 0
LIGHT-HEADEDNESS: 0
NUMBNESS: 0
RHINORRHEA: 0
SORE THROAT: 0
SLEEP DISTURBANCE: 0
NERVOUS/ANXIOUS: 0
POLYDIPSIA: 0
BREAST MASS: 0
CHEST TIGHTNESS: 0
WHEEZING: 0
DIARRHEA: 0
EYE PAIN: 0
COUGH: 0
FATIGUE: 1
HEADACHES: 0
JOINT SWELLING: 0
DYSPHORIC MOOD: 0
ABDOMINAL DISTENTION: 0
POLYPHAGIA: 0
NAUSEA: 0
MYALGIAS: 0
PARESTHESIAS: 0
ABDOMINAL PAIN: 0
WEAKNESS: 0
CONSTIPATION: 0
PALPITATIONS: 0
ACTIVITY CHANGE: 0
FREQUENCY: 0
FEVER: 0
DIZZINESS: 0
SHORTNESS OF BREATH: 0
CHILLS: 0
HEARTBURN: 0
DIFFICULTY URINATING: 0

## 2022-06-06 ASSESSMENT — PAIN SCALES - GENERAL: PAINLEVEL: NO PAIN (0)

## 2022-06-06 NOTE — PROGRESS NOTES
SUBJECTIVE:   CC: Gilberto Bauman is an 61 year old woman who presents for preventive health visit.     Chief Complaint   Patient presents with     Physical     Pt is fasting           Healthy Habits:     Getting at least 3 servings of Calcium per day:  Yes    Bi-annual eye exam:  Yes    Dental care twice a year:  Yes    Sleep apnea or symptoms of sleep apnea:  None    Diet:  Regular (no restrictions)    Frequency of exercise:  1 day/week    Duration of exercise:  15-30 minutes    Taking medications regularly:  No    Barriers to taking medications:  Not applicable    Medication side effects:  None    PHQ-2 Total Score: 0    Additional concerns today:  No              Today's PHQ-2 Score:   PHQ-2 ( 1999 Pfizer) 6/6/2022   Q1: Little interest or pleasure in doing things 0   Q2: Feeling down, depressed or hopeless 0   PHQ-2 Score 0   Q1: Little interest or pleasure in doing things Not at all   Q2: Feeling down, depressed or hopeless Not at all   PHQ-2 Score 0       Abuse: Current or Past (Physical, Sexual or Emotional) - No  Do you feel safe in your environment? Yes        Social History     Tobacco Use     Smoking status: Never Smoker     Smokeless tobacco: Never Used   Substance Use Topics     Alcohol use: No         Alcohol Use 6/6/2022   Prescreen: >3 drinks/day or >7 drinks/week? Not Applicable   Prescreen: >3 drinks/day or >7 drinks/week? -       Reviewed orders with patient.  Reviewed health maintenance and updated orders accordingly - Yes  Current Outpatient Medications   Medication Sig Dispense Refill     levothyroxine (SYNTHROID/LEVOTHROID) 88 MCG tablet Take 1 tablet (88 mcg) by mouth daily (Patient not taking: Reported on 6/6/2022) 90 tablet 0     Allergies   Allergen Reactions     Amoxicillin      Rash       Breast Cancer Screening:  Any new diagnosis of family breast, ovarian, or bowel cancer? No    FHS-7: No flowsheet data found.    Mammogram Screening: Recommended mammography every 1-2 years with patient  discussion and risk factor consideration  Pertinent mammograms are reviewed under the imaging tab.    History of abnormal Pap smear: NO - age 30-65 PAP every 5 years with negative HPV co-testing recommended  PAP / HPV 1/15/2016   PAP (Historical) NIL     Reviewed and updated as needed this visit by clinical staff   Tobacco  Allergies  Meds   Med Hx  Surg Hx  Fam Hx  Soc Hx          Reviewed and updated as needed this visit by Provider                     Here today for physical.  Is fasting.  Has been very tired and fatigued.  Feels like he is sleeping well.  Would like to have labs checked.    Last Pap: 2016 normal  Last mammogram: Never, no family history  Last BMD: Not applicable  Last Colonoscopy: Never, no family history  Last eye exam: annually  Last dental exam: every 6-12 mo  Last tetanus vaccine: 2019  Last influenza vaccine: Not applicable  Last shingles vaccine: Never  Last pneumonia vaccine: Not applicable  Last COVID vaccine: Done   Last COVID booster: Declines  Hep C screen: 2017  HIV screen: screened during pregnancy.   AAA screen (age 65-78 with smoking hx): Not applicable  IVD (HTN, Hyperlipid, Smoking): Not applicable  Lung CA screening (50-80, 20 pk smoking hx) Quit within 15 years: Not applicable        Review of Systems   Constitutional: Positive for fatigue. Negative for activity change.   HENT: Negative for ear pain, rhinorrhea and sore throat.    Respiratory: Negative for cough, chest tightness, shortness of breath and wheezing.    Cardiovascular: Negative for chest pain and palpitations.   Gastrointestinal: Negative for abdominal distention, abdominal pain, constipation, diarrhea, nausea and vomiting.   Endocrine: Negative for cold intolerance, heat intolerance, polydipsia, polyphagia and polyuria.   Genitourinary: Negative for difficulty urinating, enuresis, frequency and urgency.   Musculoskeletal: Negative for arthralgias.   Skin: Negative for rash.   Neurological: Negative for  "dizziness, light-headedness, numbness and headaches.   Psychiatric/Behavioral: Negative for dysphoric mood and sleep disturbance. The patient is not nervous/anxious.         OBJECTIVE:   /70 (BP Location: Left arm, Patient Position: Sitting, Cuff Size: Adult Large)   Pulse 72   Temp 98.3  F (36.8  C) (Tympanic)   Resp 16   Ht 1.638 m (5' 4.5\")   Wt 89.9 kg (198 lb 3.2 oz)   LMP 01/05/2016   BMI 33.50 kg/m    Physical Exam  Constitutional:       Appearance: Normal appearance. She is well-developed.   HENT:      Head: Normocephalic and atraumatic.      Right Ear: Tympanic membrane and external ear normal. No middle ear effusion.      Left Ear: Tympanic membrane and external ear normal.  No middle ear effusion.      Nose: No mucosal edema.      Mouth/Throat:      Pharynx: Uvula midline.   Eyes:      Pupils: Pupils are equal, round, and reactive to light.   Neck:      Thyroid: No thyromegaly.      Vascular: No carotid bruit.   Cardiovascular:      Rate and Rhythm: Normal rate and regular rhythm.      Pulses:           Femoral pulses are 2+ on the right side and 2+ on the left side.       Dorsalis pedis pulses are 2+ on the right side and 2+ on the left side.        Posterior tibial pulses are 2+ on the right side and 2+ on the left side.      Heart sounds: Normal heart sounds.   Pulmonary:      Effort: Pulmonary effort is normal.      Breath sounds: Normal breath sounds.   Chest:   Breasts: Breasts are symmetrical.      Right: No inverted nipple, mass, nipple discharge, skin change or tenderness.      Left: No inverted nipple, mass, nipple discharge, skin change or tenderness.       Abdominal:      General: Bowel sounds are normal.      Palpations: Abdomen is soft.      Tenderness: There is no abdominal tenderness.   Genitourinary:     Labia:         Right: No lesion.         Left: No lesion.       Vagina: Normal. No vaginal discharge or erythema.      Cervix: No cervical motion tenderness or discharge.     " " Uterus: Not enlarged.       Adnexa:         Right: No mass or tenderness.          Left: No mass or tenderness.     Musculoskeletal:         General: Normal range of motion.      Cervical back: Normal range of motion.   Skin:     General: Skin is warm and dry.      Findings: No rash.   Neurological:      Mental Status: She is alert.   Psychiatric:         Behavior: Behavior normal.         ASSESSMENT/PLAN:   Cervical cancer screening  - Pap Screen with HPV - recommended age 30 - 65 years    Routine general medical examination at a health care facility  Screening guidelines reviewed.   - Lipid panel reflex to direct LDL Fasting; Future  - Lipid panel reflex to direct LDL Fasting    Encounter for screening mammogram for breast cancer  - MA SCREENING DIGITAL BILAT - Future  (s+30); Future    Screen for colon cancer  - COLOGUARD([employer] SCI[employer])    Fatigue, unspecified type  We will check labs here today.  - CBC with platelets; Future  - Basic metabolic panel; Future  - Vitamin D Deficiency; Future  - TSH with free T4 reflex; Future  - CBC with platelets  - Basic metabolic panel  - Vitamin D Deficiency  - TSH with free T4 reflex  - Iron & Iron Binding Capacity; Future    Need for vaccination  - HEPATITIS A VACCINE, ADULT  [[id number]]    Patient has been advised of split billing requirements and indicates understanding: Yes    COUNSELING:  Reviewed preventive health counseling, as reflected in patient instructions       Regular exercise       Healthy diet/nutrition       Vision screening       Immunizations    Vaccinated for: Hepatitis A             Family planning       Colorectal Cancer Screening       Consider Hep C screening for all patients one time for ages 18-79 years       HIV screeninx in teen years, 1x in adult years, and at intervals if high risk    Estimated body mass index is 33.5 kg/m  as calculated from the following:    Height as of this encounter: 1.638 m (5' 4.5\").    Weight as of this encounter: 89.9 " kg (198 lb 3.2 oz).    Weight management plan: Discussed healthy diet and exercise guidelines    She reports that she has never smoked. She has never used smokeless tobacco.      Counseling Resources:  ATP IV Guidelines  Pooled Cohorts Equation Calculator  Breast Cancer Risk Calculator  BRCA-Related Cancer Risk Assessment: FHS-7 Tool  FRAX Risk Assessment  ICSI Preventive Guidelines  Dietary Guidelines for Americans, 2010  USDA's MyPlate  ASA Prophylaxis  Lung CA Screening    ASHLYN Jay CNP  [facility]

## 2022-06-06 NOTE — PATIENT INSTRUCTIONS
Please go to the pharmacy for shingles vaccine.       Preventive Health Recommendations  Female Ages 50 - 64    Yearly exam: See your health care provider every year in order to  Review health changes.   Discuss preventive care.    Review your medicines if your doctor has prescribed any.    Get a Pap test every three years (unless you have an abnormal result and your provider advises testing more often).  If you get Pap tests with HPV test, you only need to test every 5 years, unless you have an abnormal result.   You do not need a Pap test if your uterus was removed (hysterectomy) and you have not had cancer.  You should be tested each year for STDs (sexually transmitted diseases) if you're at risk.   Have a mammogram every 1 to 2 years.  Have a colonoscopy at age 50, or have a yearly FIT test (stool test). These exams screen for colon cancer.    Have a cholesterol test every 5 years, or more often if advised.  Have a diabetes test (fasting glucose) every three years. If you are at risk for diabetes, you should have this test more often.   If you are at risk for osteoporosis (brittle bone disease), think about having a bone density scan (DEXA).    Shots: Get a flu shot each year. Get a tetanus shot every 10 years.    Nutrition:   Eat at least 5 servings of fruits and vegetables each day.  Eat whole-grain bread, whole-wheat pasta and brown rice instead of white grains and rice.  Get adequate Calcium and Vitamin D.     Lifestyle  Exercise at least 150 minutes a week (30 minutes a day, 5 days a week). This will help you control your weight and prevent disease.  Limit alcohol to one drink per day.  No smoking.   Wear sunscreen to prevent skin cancer.   See your dentist every six months for an exam and cleaning.  See your eye doctor every 1 to 2 years.

## 2022-06-07 LAB
ANION GAP SERPL CALCULATED.3IONS-SCNC: 7 MMOL/L (ref 3–14)
BUN SERPL-MCNC: 17 MG/DL (ref 7–30)
CALCIUM SERPL-MCNC: 9.5 MG/DL (ref 8.5–10.1)
CHLORIDE BLD-SCNC: 107 MMOL/L (ref 94–109)
CHOLEST SERPL-MCNC: 207 MG/DL
CO2 SERPL-SCNC: 25 MMOL/L (ref 20–32)
CREAT SERPL-MCNC: 0.79 MG/DL (ref 0.52–1.04)
DEPRECATED CALCIDIOL+CALCIFEROL SERPL-MC: 23 UG/L (ref 20–75)
FASTING STATUS PATIENT QL REPORTED: ABNORMAL
GFR SERPL CREATININE-BSD FRML MDRD: 86 ML/MIN/1.73M2
GLUCOSE BLD-MCNC: 97 MG/DL (ref 70–99)
HDLC SERPL-MCNC: 47 MG/DL
IRON SATN MFR SERPL: 19 % (ref 15–46)
IRON SERPL-MCNC: 62 UG/DL (ref 35–180)
LDLC SERPL CALC-MCNC: 134 MG/DL
NONHDLC SERPL-MCNC: 160 MG/DL
POTASSIUM BLD-SCNC: 4.2 MMOL/L (ref 3.4–5.3)
SODIUM SERPL-SCNC: 139 MMOL/L (ref 133–144)
T4 FREE SERPL-MCNC: 0.68 NG/DL (ref 0.76–1.46)
TIBC SERPL-MCNC: 322 UG/DL (ref 240–430)
TRIGL SERPL-MCNC: 129 MG/DL
TSH SERPL DL<=0.005 MIU/L-ACNC: 11.2 MU/L (ref 0.4–4)

## 2022-06-08 DIAGNOSIS — E06.3 HYPOTHYROIDISM DUE TO HASHIMOTO'S THYROIDITIS: ICD-10-CM

## 2022-06-08 LAB
BKR LAB AP GYN ADEQUACY: NORMAL
BKR LAB AP GYN INTERPRETATION: NORMAL
BKR LAB AP HPV REFLEX: NORMAL
BKR LAB AP PREVIOUS ABNORMAL: NORMAL
PATH REPORT.COMMENTS IMP SPEC: NORMAL
PATH REPORT.COMMENTS IMP SPEC: NORMAL
PATH REPORT.RELEVANT HX SPEC: NORMAL

## 2022-06-08 RX ORDER — LEVOTHYROXINE SODIUM 50 UG/1
50 TABLET ORAL DAILY
Qty: 90 TABLET | Refills: 0 | Status: SHIPPED | OUTPATIENT
Start: 2022-06-08 | End: 2022-09-07

## 2022-06-13 LAB
HUMAN PAPILLOMA VIRUS 16 DNA: NEGATIVE
HUMAN PAPILLOMA VIRUS 18 DNA: NEGATIVE
HUMAN PAPILLOMA VIRUS FINAL DIAGNOSIS: NORMAL
HUMAN PAPILLOMA VIRUS OTHER HR: NEGATIVE

## 2022-10-23 ENCOUNTER — HEALTH MAINTENANCE LETTER (OUTPATIENT)
Age: 62
End: 2022-10-23

## 2023-01-10 NOTE — PROGRESS NOTES
"      Subjective   Gilberto is a 62 year old, presenting for the following health issues:  Thyroid Problem (Needs refill) and Health Maintenance (Patient declines flu shot)      History of Present Illness       Hypothyroidism:     Since last visit, patient describes the following symptoms::  None    She eats 0-1 servings of fruits and vegetables daily.She consumes 1 sweetened beverage(s) daily.She exercises with enough effort to increase her heart rate 20 to 29 minutes per day.  She is missing 7 dose(s) of medications per week.  She is not taking prescribed medications regularly due to other.       Has been off of her levothyroxine for 2-3  Mos   Doing well overall. Has lost some weight. No fatigue. No bowel changes.     Review of Systems   Constitutional, HEENT, cardiovascular, pulmonary, GI, , musculoskeletal, neuro, skin, endocrine and psych systems are negative, except as otherwise noted.      Objective    /84   Pulse 76   Temp 98.1  F (36.7  C) (Tympanic)   Resp 20   Ht 1.64 m (5' 4.57\")   Wt 87.8 kg (193 lb 9.6 oz)   LMP 01/05/2016   BMI 32.65 kg/m    Body mass index is 32.65 kg/m .  Physical Exam   Eye exam - right eye normal lid, conjunctiva, cornea, pupil and fundus, left eye normal lid, conjunctiva, cornea, pupil and fundus.  Thyroid not palpable, not enlarged, no nodules detected.  CHEST:chest clear to IPPA, no tachypnea, retractions or cyanosis and S1, S2 normal, no murmur, no gallop, rate regular.  ENT exam reveals - right TM normal without fluid or infection, neck without nodes, throat normal without erythema or exudate, maxillary sinus tender, post nasal drip noted, nasal mucosa congested and nasal mucosa pale and congested.  Left tm : sizable rupture. No otorrhea.     Gilberto was seen today for thyroid problem and health maintenance.    Diagnoses and all orders for this visit:    Hypothyroidism due to Hashimoto's thyroiditis  -     levothyroxine (SYNTHROID/LEVOTHROID) 50 MCG tablet; Take 1 " tablet (50 mcg) by mouth daily  -     TSH with free T4 reflex; Future    Screen for colon cancer  -     Colonoscopy Screening  Referral; Future    Vitamin D deficiency  -     Vitamin D Deficiency; Future    Ruptured ear drum, left  -     Adult ENT  Referral; Future    Acute recurrent maxillary sinusitis  -     azithromycin (ZITHROMAX) 250 MG tablet; Two tablets first day, then one tablet daily for four days.    Chronic rhinitis  -     fluticasone (FLONASE) 50 MCG/ACT nasal spray; Spray 1 spray into both nostrils daily  -     loratadine-pseudoePHEDrine (CLARITIN-D 12-HOUR) 5-120 MG 12 hr tablet; Take 1 tablet by mouth daily      Continue current dose of levothyroxine.   Recommend a 3 mos recheck of her tsh.

## 2023-01-11 ENCOUNTER — OFFICE VISIT (OUTPATIENT)
Dept: FAMILY MEDICINE | Facility: CLINIC | Age: 63
End: 2023-01-11
Payer: COMMERCIAL

## 2023-01-11 VITALS
HEIGHT: 65 IN | HEART RATE: 76 BPM | WEIGHT: 193.6 LBS | SYSTOLIC BLOOD PRESSURE: 134 MMHG | RESPIRATION RATE: 20 BRPM | DIASTOLIC BLOOD PRESSURE: 84 MMHG | BODY MASS INDEX: 32.26 KG/M2 | TEMPERATURE: 98.1 F

## 2023-01-11 DIAGNOSIS — E55.9 VITAMIN D DEFICIENCY: ICD-10-CM

## 2023-01-11 DIAGNOSIS — J31.0 CHRONIC RHINITIS: ICD-10-CM

## 2023-01-11 DIAGNOSIS — J01.01 ACUTE RECURRENT MAXILLARY SINUSITIS: ICD-10-CM

## 2023-01-11 DIAGNOSIS — H72.92 RUPTURED EAR DRUM, LEFT: ICD-10-CM

## 2023-01-11 DIAGNOSIS — Z12.11 SCREEN FOR COLON CANCER: ICD-10-CM

## 2023-01-11 DIAGNOSIS — E06.3 HYPOTHYROIDISM DUE TO HASHIMOTO'S THYROIDITIS: Primary | ICD-10-CM

## 2023-01-11 LAB
DEPRECATED CALCIDIOL+CALCIFEROL SERPL-MC: 30 UG/L (ref 20–75)
T4 FREE SERPL-MCNC: 0.78 NG/DL (ref 0.76–1.46)
TSH SERPL DL<=0.005 MIU/L-ACNC: 7.85 MU/L (ref 0.4–4)

## 2023-01-11 PROCEDURE — 99214 OFFICE O/P EST MOD 30 MIN: CPT | Performed by: PHYSICIAN ASSISTANT

## 2023-01-11 PROCEDURE — 36415 COLL VENOUS BLD VENIPUNCTURE: CPT | Performed by: PHYSICIAN ASSISTANT

## 2023-01-11 PROCEDURE — 84439 ASSAY OF FREE THYROXINE: CPT | Performed by: PHYSICIAN ASSISTANT

## 2023-01-11 PROCEDURE — 82306 VITAMIN D 25 HYDROXY: CPT | Performed by: PHYSICIAN ASSISTANT

## 2023-01-11 PROCEDURE — 84443 ASSAY THYROID STIM HORMONE: CPT | Performed by: PHYSICIAN ASSISTANT

## 2023-01-11 RX ORDER — AZITHROMYCIN 250 MG/1
TABLET, FILM COATED ORAL
Qty: 6 TABLET | Refills: 0 | Status: SHIPPED | OUTPATIENT
Start: 2023-01-11

## 2023-01-11 RX ORDER — FLUTICASONE PROPIONATE 50 MCG
1 SPRAY, SUSPENSION (ML) NASAL DAILY
Qty: 16 G | Refills: 1 | Status: SHIPPED | OUTPATIENT
Start: 2023-01-11

## 2023-01-11 RX ORDER — LEVOTHYROXINE SODIUM 50 UG/1
50 TABLET ORAL DAILY
Qty: 90 TABLET | Refills: 3 | Status: SHIPPED | OUTPATIENT
Start: 2023-01-11

## 2023-01-11 ASSESSMENT — PAIN SCALES - GENERAL: PAINLEVEL: NO PAIN (0)

## 2023-04-02 ENCOUNTER — HEALTH MAINTENANCE LETTER (OUTPATIENT)
Age: 63
End: 2023-04-02

## 2023-05-08 ENCOUNTER — PATIENT OUTREACH (OUTPATIENT)
Dept: CARE COORDINATION | Facility: CLINIC | Age: 63
End: 2023-05-08
Payer: COMMERCIAL

## 2023-05-22 ENCOUNTER — PATIENT OUTREACH (OUTPATIENT)
Dept: CARE COORDINATION | Facility: CLINIC | Age: 63
End: 2023-05-22
Payer: COMMERCIAL

## 2023-08-03 ENCOUNTER — PATIENT OUTREACH (OUTPATIENT)
Dept: FAMILY MEDICINE | Facility: CLINIC | Age: 63
End: 2023-08-03
Payer: COMMERCIAL

## 2023-08-03 NOTE — TELEPHONE ENCOUNTER
Patient Quality Outreach    Patient is due for the following:   Colon Cancer Screening  Breast Cancer Screening - Mammogram  Physical Preventive Adult Physical    Next Steps:   Schedule a Adult Preventative    Type of outreach:    Sent [employer] message.      Questions for provider review:    None           Carmen Busch, CMA

## 2023-09-02 ENCOUNTER — HEALTH MAINTENANCE LETTER (OUTPATIENT)
Age: 63
End: 2023-09-02

## 2023-12-22 ENCOUNTER — TELEPHONE (OUTPATIENT)
Dept: FAMILY MEDICINE | Facility: CLINIC | Age: 63
End: 2023-12-22

## 2023-12-22 NOTE — TELEPHONE ENCOUNTER
Patient Quality Outreach    Patient is due for the following:   Colon Cancer Screening  Breast Cancer Screening - Mammogram  Physical Preventive Adult Physical      Topic Date Due    Zoster (Shingles) Vaccine (1 of 2) Never done    Flu Vaccine (1) Never done    COVID-19 Vaccine (3 - 2023-24 season) 09/01/2023         Type of outreach:    Sent Amanda Huff DBA [employer] message.      Questions for provider review:    None           Stephanie Carson MA  Chart routed to Care Team.

## 2023-12-22 NOTE — LETTER
January 25, 2024    To  Gilberto Bauman  [address] [address] [place] [id number]    Your team at [facility] cares about your health. We have reviewed your chart and based on our findings; we are making the following recommendations to better manage your health.   We see you have not read your [employer] messages.   You are in particular need of attention regarding the following:     Schedule Annual MAMMOGRAPHY. [facility] scheduling number is [phone] or schedule in MyChart (self referral).  Call or MyChart message your clinic to schedule a colonoscopy, schedule/ a FIT Test, or order a Cologuard test. If you are unsure what type of test you need, please call your clinic and speak to clinic staff.   Colon cancer is now the second leading cause of cancer-related deaths in the [place] for both men and women and there are over 130,000 new cases and 50,000 deaths per year from colon cancer. Colonoscopies can prevent 90-95% of these deaths. Problem lesions can be removed before they ever become cancer. This test is not only looking for cancer, but also getting rid of precancerous lesions.   Please schedule a Nurse Only Appointment with your primary care clinic to update your immunizations that are due.  PREVENTATIVE VISIT: Physical    If you have already completed these items, please contact the clinic via phone or   [employer] so your care team can review and update your records. Thank you for   choosing [facility] for your healthcare needs. For any questions,   concerns, or to schedule an appointment please contact our clinic.    Healthy Regards,      Your [facility] Care Team

## 2024-01-25 NOTE — TELEPHONE ENCOUNTER
Patient Quality Outreach    Type of outreach:    Sent letter.    Next Steps:  Reach out within 90 days via Phone, MyChart, and Letter.    Max number of attempts reached: No. Will try again in 90 days if patient still on fail list.        Stephanie Carson MA  Chart routed to Care Team.

## 2024-10-26 ENCOUNTER — HEALTH MAINTENANCE LETTER (OUTPATIENT)
Age: 64
End: 2024-10-26

## 2024-11-19 ENCOUNTER — OFFICE VISIT (OUTPATIENT)
Dept: FAMILY MEDICINE | Facility: CLINIC | Age: 64
End: 2024-11-19

## 2024-11-19 VITALS
TEMPERATURE: 97.7 F | DIASTOLIC BLOOD PRESSURE: 77 MMHG | HEIGHT: 65 IN | SYSTOLIC BLOOD PRESSURE: 113 MMHG | BODY MASS INDEX: 30.26 KG/M2 | HEART RATE: 68 BPM | OXYGEN SATURATION: 100 % | WEIGHT: 181.6 LBS

## 2024-11-19 DIAGNOSIS — Z00.00 ROUTINE GENERAL MEDICAL EXAMINATION AT A HEALTH CARE FACILITY: Primary | ICD-10-CM

## 2024-11-19 DIAGNOSIS — H72.92 PERFORATION OF LEFT TYMPANIC MEMBRANE: ICD-10-CM

## 2024-11-19 DIAGNOSIS — Z13.220 SCREENING, LIPID: ICD-10-CM

## 2024-11-19 DIAGNOSIS — Z13.1 SCREENING FOR DIABETES MELLITUS: ICD-10-CM

## 2024-11-19 DIAGNOSIS — Z12.11 SCREEN FOR COLON CANCER: ICD-10-CM

## 2024-11-19 DIAGNOSIS — E06.3 HYPOTHYROIDISM DUE TO HASHIMOTO'S THYROIDITIS: ICD-10-CM

## 2024-11-19 DIAGNOSIS — R53.83 OTHER FATIGUE: ICD-10-CM

## 2024-11-19 DIAGNOSIS — Z12.31 VISIT FOR SCREENING MAMMOGRAM: ICD-10-CM

## 2024-11-19 LAB
ALBUMIN UR-MCNC: NEGATIVE MG/DL
ANION GAP SERPL CALCULATED.3IONS-SCNC: 12 MMOL/L (ref 7–15)
APPEARANCE UR: CLEAR
BACTERIA #/AREA URNS HPF: ABNORMAL /HPF
BILIRUB UR QL STRIP: NEGATIVE
BUN SERPL-MCNC: 16.1 MG/DL (ref 8–23)
CALCIUM SERPL-MCNC: 9.8 MG/DL (ref 8.8–10.4)
CHLORIDE SERPL-SCNC: 106 MMOL/L (ref 98–107)
CHOLEST SERPL-MCNC: 209 MG/DL
COLOR UR AUTO: YELLOW
CREAT SERPL-MCNC: 0.89 MG/DL (ref 0.51–0.95)
EGFRCR SERPLBLD CKD-EPI 2021: 73 ML/MIN/1.73M2
EST. AVERAGE GLUCOSE BLD GHB EST-MCNC: 114 MG/DL
FASTING STATUS PATIENT QL REPORTED: YES
FASTING STATUS PATIENT QL REPORTED: YES
FERRITIN SERPL-MCNC: 121 NG/ML (ref 11–328)
FOLATE SERPL-MCNC: 14.2 NG/ML (ref 4.6–34.8)
GLUCOSE SERPL-MCNC: 101 MG/DL (ref 70–99)
GLUCOSE UR STRIP-MCNC: NEGATIVE MG/DL
HBA1C MFR BLD: 5.6 % (ref 0–5.6)
HCO3 SERPL-SCNC: 22 MMOL/L (ref 22–29)
HDLC SERPL-MCNC: 46 MG/DL
HGB UR QL STRIP: NEGATIVE
IRON BINDING CAPACITY (ROCHE): 272 UG/DL (ref 240–430)
IRON SATN MFR SERPL: 36 % (ref 15–46)
IRON SERPL-MCNC: 99 UG/DL (ref 37–145)
KETONES UR STRIP-MCNC: NEGATIVE MG/DL
LDLC SERPL CALC-MCNC: 143 MG/DL
LEUKOCYTE ESTERASE UR QL STRIP: ABNORMAL
MUCOUS THREADS #/AREA URNS LPF: PRESENT /LPF
NITRATE UR QL: NEGATIVE
NONHDLC SERPL-MCNC: 163 MG/DL
PH UR STRIP: 5.5 [PH] (ref 5–7)
POTASSIUM SERPL-SCNC: 4.2 MMOL/L (ref 3.4–5.3)
RBC #/AREA URNS AUTO: ABNORMAL /HPF
SODIUM SERPL-SCNC: 140 MMOL/L (ref 135–145)
SP GR UR STRIP: >=1.03 (ref 1–1.03)
SQUAMOUS #/AREA URNS AUTO: ABNORMAL /LPF
T4 FREE SERPL-MCNC: 0.78 NG/DL (ref 0.9–1.7)
TRIGL SERPL-MCNC: 101 MG/DL
TSH SERPL DL<=0.005 MIU/L-ACNC: 15.3 UIU/ML (ref 0.3–4.2)
UROBILINOGEN UR STRIP-ACNC: 0.2 E.U./DL
VIT B12 SERPL-MCNC: 365 PG/ML (ref 232–1245)
VIT D+METAB SERPL-MCNC: 29 NG/ML (ref 20–50)
WBC #/AREA URNS AUTO: ABNORMAL /HPF

## 2024-11-19 PROCEDURE — 82728 ASSAY OF FERRITIN: CPT | Performed by: PHYSICIAN ASSISTANT

## 2024-11-19 PROCEDURE — 36415 COLL VENOUS BLD VENIPUNCTURE: CPT | Performed by: PHYSICIAN ASSISTANT

## 2024-11-19 PROCEDURE — 80048 BASIC METABOLIC PNL TOTAL CA: CPT | Performed by: PHYSICIAN ASSISTANT

## 2024-11-19 PROCEDURE — 83540 ASSAY OF IRON: CPT | Performed by: PHYSICIAN ASSISTANT

## 2024-11-19 PROCEDURE — 82306 VITAMIN D 25 HYDROXY: CPT | Performed by: PHYSICIAN ASSISTANT

## 2024-11-19 PROCEDURE — 84443 ASSAY THYROID STIM HORMONE: CPT | Performed by: PHYSICIAN ASSISTANT

## 2024-11-19 PROCEDURE — 81001 URINALYSIS AUTO W/SCOPE: CPT | Performed by: PHYSICIAN ASSISTANT

## 2024-11-19 PROCEDURE — 99214 OFFICE O/P EST MOD 30 MIN: CPT | Mod: 25 | Performed by: PHYSICIAN ASSISTANT

## 2024-11-19 PROCEDURE — 83550 IRON BINDING TEST: CPT | Performed by: PHYSICIAN ASSISTANT

## 2024-11-19 PROCEDURE — 80061 LIPID PANEL: CPT | Performed by: PHYSICIAN ASSISTANT

## 2024-11-19 PROCEDURE — 99396 PREV VISIT EST AGE 40-64: CPT | Performed by: PHYSICIAN ASSISTANT

## 2024-11-19 PROCEDURE — 87086 URINE CULTURE/COLONY COUNT: CPT | Performed by: PHYSICIAN ASSISTANT

## 2024-11-19 PROCEDURE — 83036 HEMOGLOBIN GLYCOSYLATED A1C: CPT | Performed by: PHYSICIAN ASSISTANT

## 2024-11-19 PROCEDURE — 82746 ASSAY OF FOLIC ACID SERUM: CPT | Performed by: PHYSICIAN ASSISTANT

## 2024-11-19 PROCEDURE — 82607 VITAMIN B-12: CPT | Performed by: PHYSICIAN ASSISTANT

## 2024-11-19 PROCEDURE — 84439 ASSAY OF FREE THYROXINE: CPT | Performed by: PHYSICIAN ASSISTANT

## 2024-11-19 SDOH — HEALTH STABILITY: PHYSICAL HEALTH: ON AVERAGE, HOW MANY MINUTES DO YOU ENGAGE IN EXERCISE AT THIS LEVEL?: 10 MIN

## 2024-11-19 SDOH — HEALTH STABILITY: PHYSICAL HEALTH: ON AVERAGE, HOW MANY DAYS PER WEEK DO YOU ENGAGE IN MODERATE TO STRENUOUS EXERCISE (LIKE A BRISK WALK)?: 2 DAYS

## 2024-11-19 ASSESSMENT — SOCIAL DETERMINANTS OF HEALTH (SDOH): HOW OFTEN DO YOU GET TOGETHER WITH FRIENDS OR RELATIVES?: MORE THAN THREE TIMES A WEEK

## 2024-11-19 NOTE — PATIENT INSTRUCTIONS
Please talk to your preferred pharmacy about shingles vaccination as you are due for this and it is typically cheaper through a pharmacy.   Lifestyle recommendations:  Being overweight or obese puts you are risk of major health problems including but not limited to: heart disease/heart attack, stroke, high cholesterol, high blood pressure, and diabetes.  This is why it is important to be at a healthy weight for your height.     Exercise 30 minutes 5 times a week, if you can only do 10 minutes 3 times a week that is still shown to have great benefit!  Brisk walking even counts for this.  Consider free youtube videos for exercise that fits your needs and lifestyle.     Monitor your caffeine and soda intake, try to minimize these beverages    Drink plenty of water (about 70-80 ounces a day)    Try to eat a vegetable and fruit  with lunch and dinner.  Have a breakfast that contains protein such as eggs or oatmeal.  Decrease your white bread, pasta, and sweets intake.  Increase lean proteins like chicken or pork. Try to eat out 1-2 times a week or less.  Monitor your portion sizes, try using smaller plates if needed.  Eat slowly, this gives you time to be aware that your body is full.       Please let know which colon screening I can order for you once you decide.  It is very important as if found early it is much more likely treatable.  By the time you have symptoms, often it is not as treatable.  There are two options:      1)  Colonoscopy-this is the best screening tool we have for colon cancer currently. It is a procedure where a provider uses a scope with a camera to look at your entire colon. If they find polyps, which can be precancerous, they can remove them right there. If your test is normal you will need another one in ten years.  If it is abnormal, then you will need it sooner depending on what they find.  You take a prep the day before which gives you diarrhea to clean out your colon so they are able to  visualize things. It is a sedated procedure so you do need a  that day.      2) Cologuard- this is a kit the company mails directly to your house.  You collect a small stool sample and send it back for testing.  The company sends someone to come pick it up from your house when you are ready.  If it is negative you will need to repeat the test in 3 years. If it is positive, you will need a colonoscopy to look for polyps and/or colon cancer.     Let me know at any time if you would like a referral to a nutritionist!      Preventive Care Advice   This is general advice given by our system to help you stay healthy. However, your care team may have specific advice just for you. Please talk to your care team about your preventive care needs.  Nutrition  Eat 5 or more servings of fruits and vegetables each day.  Try wheat bread, brown rice and whole grain pasta (instead of white bread, rice, and pasta).  Get enough calcium and vitamin D. Check the label on foods and aim for 100% of the RDA (recommended daily allowance).  Lifestyle  Exercise at least 150 minutes each week  (30 minutes a day, 5 days a week).  Do muscle strengthening activities 2 days a week. These help control your weight and prevent disease.  No smoking.  Wear sunscreen to prevent skin cancer.  Have a dental exam and cleaning every 6 months.  Yearly exams  See your health care team every year to talk about:  Any changes in your health.  Any medicines your care team has prescribed.  Preventive care, family planning, and ways to prevent chronic diseases.  Shots (vaccines)   HPV shots (up to age 26), if you've never had them before.  Hepatitis B shots (up to age 59), if you've never had them before.  COVID-19 shot: Get this shot when it's due.  Flu shot: Get a flu shot every year.  Tetanus shot: Get a tetanus shot every 10 years.  Pneumococcal, hepatitis A, and RSV shots: Ask your care team if you need these based on your risk.  Shingles shot (for age 50  and up)  General health tests  Diabetes screening:  Starting at age 35, Get screened for diabetes at least every 3 years.  If you are younger than age 35, ask your care team if you should be screened for diabetes.  Cholesterol test: At age 39, start having a cholesterol test every 5 years, or more often if advised.  Bone density scan (DEXA): At age 50, ask your care team if you should have this scan for osteoporosis (brittle bones).  Hepatitis C: Get tested at least once in your life.  STIs (sexually transmitted infections)  Before age 24: Ask your care team if you should be screened for STIs.  After age 24: Get screened for STIs if you're at risk. You are at risk for STIs (including HIV) if:  You are sexually active with more than one person.  You don't use condoms every time.  You or a partner was diagnosed with a sexually transmitted infection.  If you are at risk for HIV, ask about PrEP medicine to prevent HIV.  Get tested for HIV at least once in your life, whether you are at risk for HIV or not.  Cancer screening tests  Cervical cancer screening: If you have a cervix, begin getting regular cervical cancer screening tests starting at age 21.  Breast cancer scan (mammogram): If you've ever had breasts, begin having regular mammograms starting at age 40. This is a scan to check for breast cancer.  Colon cancer screening: It is important to start screening for colon cancer at age 45.  Have a colonoscopy test every 10 years (or more often if you're at risk) Or, ask your provider about stool tests like a FIT test every year or Cologuard test every 3 years.  To learn more about your testing options, visit:   .  For help making a decision, visit:   https://bit.ly/dw87157.  Prostate cancer screening test: If you have a prostate, ask your care team if a prostate cancer screening test (PSA) at age 55 is right for you.  Lung cancer screening: If you are a current or former smoker ages 50 to 80, ask your care team if  ongoing lung cancer screenings are right for you.  For informational purposes only. Not to replace the advice of your health care provider. Copyright   2023 [facility]. All rights reserved. Clinically reviewed by the [facility] Transitions Program. [employer] 163220 - REV 01/24.

## 2024-11-19 NOTE — RESULT ENCOUNTER NOTE
Collin Macias,       Your recent test results are attached, if you have any questions or concerns please feel free to contact me via e-mail or call [phone].  Your urine quick test was somewhat abnormal, will need to be cultured to see if an infection or a just contaminated sample. Culture is a much better test.       Sincerely,  Samantha Torres PA-C

## 2024-11-19 NOTE — PROGRESS NOTES
"Preventive Care Visit  [facility] Torres PA-C, Family Medicine  Nov 19, 2024      Assessment & Plan     Routine general medical examination at a health care facility      Perforation of left tympanic membrane  Has not healed for months-years  Referred  No infection noted today, water precautions discussed as she has not been doing them  Was told previously she had this  - Adult ENT  Referral; Future    Hypothyroidism due to Hashimoto's thyroiditis    - TSH WITH FREE T4 REFLEX; Future  - TSH with free T4 reflex; Future    Visit for screening mammogram      Screen for colon cancer      Screening for diabetes mellitus    - Hemoglobin A1c; Future  - Basic metabolic panel  (Ca, Cl, CO2, Creat, Gluc, K, Na, BUN); Future    Screening, lipid    - Lipid panel reflex to direct LDL Fasting; Future    BMI 30.0-30.9,adult      Other fatigue  Would like vitamins checked  Has been on thyroid meds for 1-2 years could be the cause we discussed    - Vitamin D Deficiency; Future  - Ferritin; Future  - Iron and iron binding capacity; Future  - Vitamin B12; Future  - Folate; Future  - UA Macroscopic with reflex to Microscopic and Culture - Lab Collect; Future            BMI  Estimated body mass index is 30.44 kg/m  as calculated from the following:    Height as of this encounter: 1.645 m (5' 4.76\").    Weight as of this encounter: 82.4 kg (181 lb 9.6 oz).   Weight management plan: Discussed healthy diet and exercise guidelines    Counseling  Appropriate preventive services were addressed with this patient via screening, questionnaire, or discussion as appropriate for fall prevention, nutrition, physical activity, Tobacco-use cessation, social engagement, weight loss and cognition.  Checklist reviewing preventive services available has been given to the patient.  Reviewed patient's diet, addressing concerns and/or questions.   She is at risk for lack of exercise and has been provided with " information to increase physical activity for the benefit of her well-being.   The patient was instructed to see the dentist every 6 months.           Genet Macias is a 63 year old, presenting for the following:  Physical and Headache (Left side, states possibly ear infection, x 2 days ago)        11/19/2024     9:02 AM   Additional Questions   Roomed by Aria LU CMA   Accompanied by alone         11/19/2024     9:02 AM   Patient Reported Additional Medications   Patient reports taking the following new medications none          HPI  New patient to me.    New concern:  Headache and left ear pain for 2 days.  Has had issues in the past. No headache now. Has had ear infections in the past on that side per patient. She saw ent but I don't see records in the past 2 years when she went.     Pap due 2027.   Due for mammo. Declines never gets per patient.   Colon screening? Wants to think about not wanting today ordered .        Health Care Directive  Patient does not have a Health Care Directive:       11/19/2024   General Health   How would you rate your overall physical health? Excellent   Feel stress (tense, anxious, or unable to sleep) Not at all            11/19/2024   Nutrition   Three or more servings of calcium each day? (!) I DON'T KNOW   Diet: Regular (no restrictions)   How many servings of fruit and vegetables per day? (!) 0-1   How many sweetened beverages each day? (!) 2            11/19/2024   Exercise   Days per week of moderate/strenous exercise 2 days   Average minutes spent exercising at this level 10 min      (!) EXERCISE CONCERN      11/19/2024   Social Factors   Frequency of gathering with friends or relatives More than three times a week   Worry food won't last until get money to buy more No   Food not last or not have enough money for food? No   Do you have housing? (Housing is defined as stable permanent housing and does not include staying ouside in a car, in a tent, in an abandoned building,  in an overnight shelter, or couch-surfing.) Yes   Are you worried about losing your housing? No   Lack of transportation? No   Unable to get utilities (heat,electricity)? No            11/19/2024   Fall Risk   Fallen 2 or more times in the past year? No    Trouble with walking or balance? No        Patient-reported          11/19/2024   Dental   Dentist two times every year? (!) NO            11/19/2024   TB Screening   Were you born outside of the [place]? Yes            Today's PHQ-2 Score:       11/19/2024     9:03 AM   PHQ-2 ( 1999 [employer])   Q1: Little interest or pleasure in doing things 0    Q2: Feeling down, depressed or hopeless 0    PHQ-2 Score 0    Q1: Little interest or pleasure in doing things Not at all   Q2: Feeling down, depressed or hopeless Not at all   PHQ-2 Score 0       Patient-reported           11/19/2024   Substance Use   Alcohol more than 3/day or more than 7/wk Not Applicable   Do you use any other substances recreationally? No        Social History     Tobacco Use    Smoking status: Never    Smokeless tobacco: Never   Vaping Use    Vaping status: Never Used   Substance Use Topics    Alcohol use: No    Drug use: No        DECLINES mammo and colon.             11/19/2024   One time HIV Screening   Previous HIV test? No          11/19/2024   STI Screening   New sexual partner(s) since last STI/HIV test? No        History of abnormal Pap smear: No - age 30- 64 PAP with HPV every 5 years recommended        Latest Ref Rng & Units 6/6/2022    11:22 AM 1/15/2016    12:00 AM   PAP / HPV   PAP  Negative for Intraepithelial Lesion or Malignancy (NILM)     PAP (Historical)   NIL    HPV 16 DNA Negative Negative     HPV 18 DNA Negative Negative     Other HR HPV Negative Negative       ASCVD Risk   The 10-year ASCVD risk score (Princess CRESPO, et al., 2019) is: 4.5%    Values used to calculate the score:      Age: 63 years      Sex: Female      Is Non- : Yes      Diabetic: No       "Tobacco smoker: No      Systolic Blood Pressure: 113 mmHg      Is BP treated: No      HDL Cholesterol: 47 mg/dL      Total Cholesterol: 207 mg/dL           Reviewed and updated as needed this visit by Provider                         Objective    Exam  /77   Pulse 68   Temp 97.7  F (36.5  C) (Temporal)   Ht 1.645 m (5' 4.76\")   Wt 82.4 kg (181 lb 9.6 oz)   LMP 01/05/2016   SpO2 100%   BMI 30.44 kg/m     Estimated body mass index is 30.44 kg/m  as calculated from the following:    Height as of this encounter: 1.645 m (5' 4.76\").    Weight as of this encounter: 82.4 kg (181 lb 9.6 oz).    Physical Exam  GENERAL: alert and no distress  EYES: Eyes grossly normal to inspection, PERRL and conjunctivae and sclerae normal  HENT: right ear: normal: no effusions, no erythema, normal landmarks, left ear: perforation central no drainage, oropharynx clear, and oral mucous membranes moist  NECK: no adenopathy, no asymmetry, masses, or scars  RESP: lungs clear to auscultation - no rales, rhonchi or wheezes  BREAST: normal without masses, tenderness or nipple discharge and no palpable axillary masses or adenopathy  CV: regular rate and rhythm, normal S1 S2, no S3 or S4, no murmur, click or rub, no peripheral edema  ABDOMEN: soft, nontender, no hepatosplenomegaly, no masses and bowel sounds normal  MS: no gross musculoskeletal defects noted, no edema  SKIN: no suspicious lesions or rashes  NEURO: Normal strength and tone, mentation intact and speech normal  PSYCH: mentation appears normal, affect normal/bright        Signed Electronically by: Samantha Torres PA-C    "

## 2024-11-21 LAB — BACTERIA UR CULT: NORMAL

## 2024-11-21 NOTE — RESULT ENCOUNTER NOTE
Collin Macias,       Your recent test results are attached, if you have any questions or concerns please feel free to contact me via e-mail or call [phone].  Thyroid labs show that you should be on medication.  They are abnormal. Do you know what dose of thyroid medication you took previously? Also what pharmacy would you like?    Cholesterol is elevated but not to the point where you need medication. Sodium and potassium normal. Blood sugar (glucose) close to normal.  Creatinine and GFR normal, which means kidney function is normal.  Vitamin B12 normal.  Iron normal.  Vitamin D normal.  Folate normal.  Your fatigue is likely due to to your low thyroid.      Sincerely,  Samantha Torres PA-C

## 2024-11-26 ENCOUNTER — TELEPHONE (OUTPATIENT)
Dept: FAMILY MEDICINE | Facility: CLINIC | Age: 64
End: 2024-11-26

## 2024-11-26 NOTE — TELEPHONE ENCOUNTER
Called patient. And she wants to talk with provider only regarding thyroid labs only, before starting any med again for this.    I did advise her of all her lab results.    Suzy SORTO RN  Triage Nurse  [facility]

## 2024-11-26 NOTE — TELEPHONE ENCOUNTER
Please set up a virtual visit to discuss/set a time to talk about her questions. Samantha Torres PA-C

## 2024-11-26 NOTE — TELEPHONE ENCOUNTER
PLEASE CALL PATIENT they never read their my chart message    Collin Gilberto,        Your recent test results are attached, if you have any questions or concerns please feel free to contact me via e-mail or call [phone].  Thyroid labs show that you should be on medication.  They are abnormal. Do you know what dose of thyroid medication you took previously? Also what pharmacy would you like?     Cholesterol is elevated but not to the point where you need medication. Sodium and potassium normal. Blood sugar (glucose) close to normal.  Creatinine and GFR normal, which means kidney function is normal.  Vitamin B12 normal.  Iron normal.  Vitamin D normal.  Folate normal.  Your fatigue is likely due to to your low thyroid.        Sincerely,  Samantha Torres PA-C

## 2024-12-02 ENCOUNTER — VIRTUAL VISIT (OUTPATIENT)
Dept: FAMILY MEDICINE | Facility: CLINIC | Age: 64
End: 2024-12-02

## 2024-12-02 DIAGNOSIS — E06.3 HYPOTHYROIDISM DUE TO HASHIMOTO'S THYROIDITIS: Primary | ICD-10-CM

## 2024-12-02 PROCEDURE — G2211 COMPLEX E/M VISIT ADD ON: HCPCS | Mod: 95 | Performed by: PHYSICIAN ASSISTANT

## 2024-12-02 PROCEDURE — 99213 OFFICE O/P EST LOW 20 MIN: CPT | Mod: 95 | Performed by: PHYSICIAN ASSISTANT

## 2024-12-02 RX ORDER — LEVOTHYROXINE SODIUM 25 UG/1
25 TABLET ORAL DAILY
Qty: 90 TABLET | Refills: 1 | Status: SHIPPED | OUTPATIENT
Start: 2024-12-02

## 2024-12-26 ENCOUNTER — TELEPHONE (OUTPATIENT)
Dept: FAMILY MEDICINE | Facility: CLINIC | Age: 64
End: 2024-12-26

## 2024-12-26 NOTE — LETTER
March 10, 2025    To  Gilberto Bauman  [id number] [address] [place] [id number]    Your team at [facility] cares about your health. We have reviewed your chart and based on our findings; we are making the following recommendations to better manage your health.     You are in particular need of attention regarding the following:     Call or MyChart message your clinic to schedule a colonoscopy, schedule/ a FIT Test, or order a Cologuard test. If you are unsure what type of test you need, please call your clinic and speak to clinic staff.   If you are under/uninsured, we recommend you contact the [employer] Program.[employer] is a free colorectal cancer screening program that provides colonoscopies for eligible under/uninsured [place] men and women. If you are interested in receiving a free colonoscopy, please call [employer] at t [phone] (mention code [email or web address]) to see if you're eligible. Please have them send us the results.   Schedule Annual MAMMOGRAPHY. [facility] scheduling number is [phone] or schedule in MyChart (self referral).  Please schedule a Nurse Only Appointment with your primary care clinic to update your immunizations that are due.    If you have already completed these items, please contact the clinic via phone or   [employer] so your care team can review and update your records. Thank you for   choosing [facility] for your healthcare needs. For any questions,   concerns, or to schedule an appointment please contact our clinic.    Healthy Regards,      Your [facility] Care Team            Electronically signed

## 2024-12-26 NOTE — TELEPHONE ENCOUNTER
Patient Quality Outreach    Patient is due for the following:   Colon Cancer Screening  Breast Cancer Screening - Mammogram      Topic Date Due    Pneumococcal Vaccine (1 of 1 - PCV) Never done    Zoster (Shingles) Vaccine (1 of 2) Never done    Flu Vaccine (1) Never done    COVID-19 Vaccine (3 - 2024-25 season) 09/01/2024       Type of outreach:    Sent [facility] message.    Questions for provider review:    None           Ann Malin  Chart routed to Care Team.

## 2025-01-27 NOTE — TELEPHONE ENCOUNTER
Patient Quality Outreach    Patient is due for the following:   Colon Cancer Screening  Breast Cancer Screening - Mammogram      Topic Date Due    Pneumococcal Vaccine (1 of 1 - PCV) Never done    Zoster (Shingles) Vaccine (1 of 2) Never done    Flu Vaccine (1) Never done    COVID-19 Vaccine (3 - 2024-25 season) 09/01/2024       Type of outreach:    Sent [employer] message.    Questions for provider review:    None           Ann Malin  Chart routed to Care Team.

## 2025-03-10 NOTE — TELEPHONE ENCOUNTER
Patient Quality Outreach    Patient is due for the following:   Colon Cancer Screening  Breast Cancer Screening - Mammogram      Topic Date Due    Pneumococcal Vaccine (1 of 1 - PCV) Never done    Zoster (Shingles) Vaccine (1 of 2) Never done    Flu Vaccine (1) Never done    COVID-19 Vaccine (3 - 2024-25 season) 09/01/2024       Type of outreach:    Sent letter.    Questions for provider review:    None           Ann Malin CMA  Chart routed to Care Team.

## 2025-04-13 ENCOUNTER — HEALTH MAINTENANCE LETTER (OUTPATIENT)
Age: 65
End: 2025-04-13